# Patient Record
Sex: FEMALE | Race: BLACK OR AFRICAN AMERICAN | Employment: OTHER | URBAN - NONMETROPOLITAN AREA
[De-identification: names, ages, dates, MRNs, and addresses within clinical notes are randomized per-mention and may not be internally consistent; named-entity substitution may affect disease eponyms.]

---

## 2017-02-18 ENCOUNTER — NURSE TRIAGE (OUTPATIENT)
Dept: ADMINISTRATIVE | Age: 57
End: 2017-02-18

## 2017-04-05 ENCOUNTER — OFFICE VISIT (OUTPATIENT)
Dept: FAMILY MEDICINE CLINIC | Age: 57
End: 2017-04-05

## 2017-04-05 VITALS
DIASTOLIC BLOOD PRESSURE: 78 MMHG | HEART RATE: 60 BPM | BODY MASS INDEX: 35.7 KG/M2 | RESPIRATION RATE: 16 BRPM | WEIGHT: 194 LBS | SYSTOLIC BLOOD PRESSURE: 138 MMHG | TEMPERATURE: 99.1 F | HEIGHT: 62 IN

## 2017-04-05 DIAGNOSIS — I10 BENIGN ESSENTIAL HTN: ICD-10-CM

## 2017-04-05 DIAGNOSIS — R09.81 NASAL CONGESTION: ICD-10-CM

## 2017-04-05 DIAGNOSIS — Q01.9 CEPHALOCELE (HCC): ICD-10-CM

## 2017-04-05 DIAGNOSIS — E78.00 PURE HYPERCHOLESTEROLEMIA: ICD-10-CM

## 2017-04-05 DIAGNOSIS — Z00.00 ANNUAL PHYSICAL EXAM: Primary | ICD-10-CM

## 2017-04-05 PROCEDURE — 99396 PREV VISIT EST AGE 40-64: CPT | Performed by: FAMILY MEDICINE

## 2017-04-05 RX ORDER — FLUTICASONE PROPIONATE 50 MCG
1 SPRAY, SUSPENSION (ML) NASAL DAILY
Qty: 1 BOTTLE | Refills: 3 | Status: SHIPPED | OUTPATIENT
Start: 2017-04-05 | End: 2017-05-05

## 2017-05-06 PROBLEM — R79.89 ELEVATED TROPONIN: Status: ACTIVE | Noted: 2017-05-06

## 2017-05-06 PROBLEM — K22.10 EROSIVE ESOPHAGITIS: Status: ACTIVE | Noted: 2017-05-06

## 2017-05-06 PROBLEM — I10 ESSENTIAL HYPERTENSION: Status: ACTIVE | Noted: 2017-05-06

## 2017-05-06 PROBLEM — R77.8 ELEVATED TROPONIN: Status: ACTIVE | Noted: 2017-05-06

## 2017-05-06 PROBLEM — R12 HEARTBURN: Status: ACTIVE | Noted: 2017-05-06

## 2017-05-06 PROBLEM — R10.11 POSTPRANDIAL RUQ PAIN: Status: ACTIVE | Noted: 2017-05-06

## 2017-05-06 PROBLEM — R07.9 CHEST PAIN: Status: ACTIVE | Noted: 2017-05-06

## 2017-05-06 PROBLEM — K31.84 GASTROPARESIS: Status: ACTIVE | Noted: 2017-05-06

## 2017-05-09 ENCOUNTER — TELEPHONE (OUTPATIENT)
Dept: FAMILY MEDICINE CLINIC | Age: 57
End: 2017-05-09

## 2017-05-15 ENCOUNTER — OFFICE VISIT (OUTPATIENT)
Dept: FAMILY MEDICINE CLINIC | Age: 57
End: 2017-05-15

## 2017-05-15 VITALS
HEART RATE: 50 BPM | RESPIRATION RATE: 12 BRPM | BODY MASS INDEX: 35.3 KG/M2 | WEIGHT: 191.8 LBS | SYSTOLIC BLOOD PRESSURE: 110 MMHG | TEMPERATURE: 98.2 F | HEIGHT: 62 IN | DIASTOLIC BLOOD PRESSURE: 64 MMHG

## 2017-05-15 DIAGNOSIS — R07.9 CHEST PAIN, UNSPECIFIED TYPE: Primary | ICD-10-CM

## 2017-05-15 DIAGNOSIS — K31.84 GASTROPARESIS: ICD-10-CM

## 2017-05-15 PROCEDURE — 99495 TRANSJ CARE MGMT MOD F2F 14D: CPT | Performed by: FAMILY MEDICINE

## 2017-05-16 ENCOUNTER — TELEPHONE (OUTPATIENT)
Dept: FAMILY MEDICINE CLINIC | Age: 57
End: 2017-05-16

## 2017-05-16 DIAGNOSIS — K29.50 OTHER CHRONIC GASTRITIS WITHOUT HEMORRHAGE: ICD-10-CM

## 2017-05-16 DIAGNOSIS — K31.84 GASTROPARESIS: Primary | ICD-10-CM

## 2017-05-16 RX ORDER — SUCRALFATE ORAL 1 G/10ML
1 SUSPENSION ORAL 4 TIMES DAILY
Qty: 1200 ML | Refills: 3 | Status: SHIPPED | OUTPATIENT
Start: 2017-05-16 | End: 2017-11-17

## 2017-05-22 ENCOUNTER — NURSE TRIAGE (OUTPATIENT)
Dept: ADMINISTRATIVE | Age: 57
End: 2017-05-22

## 2017-06-27 ENCOUNTER — TELEPHONE (OUTPATIENT)
Dept: FAMILY MEDICINE CLINIC | Age: 57
End: 2017-06-27

## 2017-06-27 DIAGNOSIS — R05.9 COUGH: Primary | ICD-10-CM

## 2017-06-27 RX ORDER — BENZONATATE 200 MG/1
200 CAPSULE ORAL 3 TIMES DAILY PRN
Qty: 30 CAPSULE | Refills: 0 | Status: SHIPPED | OUTPATIENT
Start: 2017-06-27 | End: 2017-07-07

## 2017-07-25 ENCOUNTER — TELEPHONE (OUTPATIENT)
Dept: FAMILY MEDICINE CLINIC | Age: 57
End: 2017-07-25

## 2017-07-25 ENCOUNTER — OFFICE VISIT (OUTPATIENT)
Dept: FAMILY MEDICINE CLINIC | Age: 57
End: 2017-07-25
Payer: COMMERCIAL

## 2017-07-25 VITALS
SYSTOLIC BLOOD PRESSURE: 138 MMHG | TEMPERATURE: 97.5 F | WEIGHT: 195.8 LBS | BODY MASS INDEX: 36.03 KG/M2 | HEART RATE: 60 BPM | RESPIRATION RATE: 12 BRPM | DIASTOLIC BLOOD PRESSURE: 77 MMHG | HEIGHT: 62 IN

## 2017-07-25 DIAGNOSIS — J01.90 ACUTE RHINOSINUSITIS: Primary | ICD-10-CM

## 2017-07-25 PROCEDURE — 99213 OFFICE O/P EST LOW 20 MIN: CPT | Performed by: FAMILY MEDICINE

## 2017-07-25 RX ORDER — AMOXICILLIN 500 MG/1
500 CAPSULE ORAL 2 TIMES DAILY
Qty: 14 CAPSULE | Refills: 0 | Status: SHIPPED | OUTPATIENT
Start: 2017-07-25 | End: 2017-08-01

## 2017-07-25 RX ORDER — AMOXICILLIN AND CLAVULANATE POTASSIUM 875; 125 MG/1; MG/1
1 TABLET, FILM COATED ORAL 2 TIMES DAILY
Qty: 20 TABLET | Refills: 0 | Status: SHIPPED | OUTPATIENT
Start: 2017-07-25 | End: 2017-08-04

## 2017-07-25 ASSESSMENT — PATIENT HEALTH QUESTIONNAIRE - PHQ9
SUM OF ALL RESPONSES TO PHQ9 QUESTIONS 1 & 2: 0
1. LITTLE INTEREST OR PLEASURE IN DOING THINGS: 0
2. FEELING DOWN, DEPRESSED OR HOPELESS: 0
SUM OF ALL RESPONSES TO PHQ QUESTIONS 1-9: 0

## 2017-07-27 ENCOUNTER — HOSPITAL ENCOUNTER (OUTPATIENT)
Dept: MRI IMAGING | Age: 57
Discharge: HOME | End: 2017-07-27
Payer: COMMERCIAL

## 2017-07-27 DIAGNOSIS — H53.431 ARCUATE VISUAL FIELD DEFECT OF RIGHT EYE: ICD-10-CM

## 2017-07-27 PROCEDURE — A9579 GAD-BASE MR CONTRAST NOS,1ML: HCPCS | Performed by: OPHTHALMOLOGY

## 2017-07-27 PROCEDURE — 6360000004 HC RX CONTRAST MEDICATION: Performed by: OPHTHALMOLOGY

## 2017-07-27 PROCEDURE — 70553 MRI BRAIN STEM W/O & W/DYE: CPT

## 2017-07-27 RX ADMIN — GADOVERSETAMIDE 15 ML: 0.5 INJECTION, SOLUTION INTRAVENOUS at 11:28

## 2017-07-31 ENCOUNTER — OFFICE VISIT (OUTPATIENT)
Dept: NEUROSURGERY | Age: 57
End: 2017-07-31
Payer: COMMERCIAL

## 2017-07-31 VITALS
SYSTOLIC BLOOD PRESSURE: 150 MMHG | DIASTOLIC BLOOD PRESSURE: 83 MMHG | BODY MASS INDEX: 35.85 KG/M2 | WEIGHT: 194.8 LBS | HEART RATE: 51 BPM | HEIGHT: 62 IN

## 2017-07-31 DIAGNOSIS — Q01.9 ENCEPHALOCELE (HCC): Primary | ICD-10-CM

## 2017-07-31 PROCEDURE — 99201 PR OFFICE OUTPATIENT NEW 10 MINUTES: CPT | Performed by: NEUROLOGICAL SURGERY

## 2017-08-03 ENCOUNTER — TELEPHONE (OUTPATIENT)
Dept: FAMILY MEDICINE CLINIC | Age: 57
End: 2017-08-03

## 2017-08-03 DIAGNOSIS — F41.9 ANXIETY: Primary | ICD-10-CM

## 2017-08-03 RX ORDER — BUSPIRONE HYDROCHLORIDE 5 MG/1
5 TABLET ORAL 2 TIMES DAILY
Qty: 60 TABLET | Refills: 1 | Status: SHIPPED | OUTPATIENT
Start: 2017-08-03 | End: 2017-12-04 | Stop reason: ALTCHOICE

## 2017-08-16 ENCOUNTER — NURSE TRIAGE (OUTPATIENT)
Dept: ADMINISTRATIVE | Age: 57
End: 2017-08-16

## 2017-08-17 ENCOUNTER — NURSE TRIAGE (OUTPATIENT)
Dept: ADMINISTRATIVE | Age: 57
End: 2017-08-17

## 2017-08-18 ENCOUNTER — OFFICE VISIT (OUTPATIENT)
Dept: FAMILY MEDICINE CLINIC | Age: 57
End: 2017-08-18
Payer: COMMERCIAL

## 2017-08-18 VITALS
HEIGHT: 62 IN | SYSTOLIC BLOOD PRESSURE: 130 MMHG | TEMPERATURE: 98 F | DIASTOLIC BLOOD PRESSURE: 70 MMHG | RESPIRATION RATE: 14 BRPM | WEIGHT: 192 LBS | HEART RATE: 56 BPM | BODY MASS INDEX: 35.33 KG/M2

## 2017-08-18 DIAGNOSIS — F41.9 ANXIETY: Primary | ICD-10-CM

## 2017-08-18 PROCEDURE — 99213 OFFICE O/P EST LOW 20 MIN: CPT | Performed by: FAMILY MEDICINE

## 2017-08-18 RX ORDER — HYDROXYZINE HYDROCHLORIDE 25 MG/1
25-50 TABLET, FILM COATED ORAL EVERY 8 HOURS PRN
Qty: 60 TABLET | Refills: 1 | Status: SHIPPED | OUTPATIENT
Start: 2017-08-18 | End: 2017-12-04 | Stop reason: ALTCHOICE

## 2017-09-05 RX ORDER — ROSUVASTATIN CALCIUM 40 MG/1
40 TABLET, COATED ORAL WEEKLY
Qty: 4 TABLET | Refills: 5 | Status: SHIPPED | OUTPATIENT
Start: 2017-09-05 | End: 2018-06-19 | Stop reason: SDUPTHER

## 2017-10-09 RX ORDER — PANTOPRAZOLE SODIUM 40 MG/1
40 TABLET, DELAYED RELEASE ORAL
Qty: 60 TABLET | Refills: 3 | Status: SHIPPED | OUTPATIENT
Start: 2017-10-09 | End: 2018-10-22 | Stop reason: SDUPTHER

## 2017-10-10 ENCOUNTER — OFFICE VISIT (OUTPATIENT)
Dept: FAMILY MEDICINE CLINIC | Age: 57
End: 2017-10-10
Payer: COMMERCIAL

## 2017-10-10 VITALS
WEIGHT: 189 LBS | RESPIRATION RATE: 10 BRPM | BODY MASS INDEX: 31.49 KG/M2 | HEIGHT: 65 IN | HEART RATE: 64 BPM | TEMPERATURE: 98.4 F | DIASTOLIC BLOOD PRESSURE: 72 MMHG | SYSTOLIC BLOOD PRESSURE: 122 MMHG

## 2017-10-10 DIAGNOSIS — F43.22 ADJUSTMENT DISORDER WITH ANXIOUS MOOD: Primary | ICD-10-CM

## 2017-10-10 DIAGNOSIS — R10.11 POSTPRANDIAL RUQ PAIN: ICD-10-CM

## 2017-10-10 PROCEDURE — 99213 OFFICE O/P EST LOW 20 MIN: CPT | Performed by: FAMILY MEDICINE

## 2017-10-10 RX ORDER — METOCLOPRAMIDE 10 MG/1
10 TABLET ORAL
Qty: 120 TABLET | Refills: 3 | Status: SHIPPED | OUTPATIENT
Start: 2017-10-10 | End: 2017-11-17

## 2017-10-10 NOTE — PROGRESS NOTES
Visit Information    Have you changed or started any medications since your last visit including any over-the-counter medicines, vitamins, or herbal medicines? no   Are you having any side effects from any of your medications? -  no  Have you stopped taking any of your medications? Is so, why? -  no    Have you seen any other physician or provider since your last visit? No  Have you had any other diagnostic tests since your last visit? No  Have you been seen in the emergency room and/or had an admission to a hospital since we last saw you? No  Have you had your routine dental cleaning in the past 6 months? yes     Have you activated your MyLikes account? If not, what are your barriers?  Yes     Patient Care Team:  Avelina Darnell DO as PCP - General (Family Medicine)        Health Maintenance   Topic Date Due    Hepatitis C screen  1960    HIV screen  06/18/1974    DTaP/Tdap/Td vaccine (1 - Tdap) 06/18/1978    Flu vaccine (1) 09/01/2017    Cervical cancer screen  12/15/2017    Breast cancer screen  11/17/2018    Lipid screen  05/08/2022    Colon cancer screen colonoscopy  12/15/2022

## 2017-10-10 NOTE — PROGRESS NOTES
Edita Whipple is a 61 y.o. female that presents for Anxiety (6 week f/u) and Immunizations (Td, pt states she was scratched by a nail a couple weeks ago.)        HPI:    Anxiety     HPI:  States that she is doing better since starting the buspar. She also states that her eye is doing better. States that her stress level is improving. Mood is 'up and down'. Inciting events or triggers for anxiety - related to her health   Frequency of anxiety - several days per week  Sleep Disturbances? Yes - sometimes  Impaired concentration? No  Substance abuse? No  Suicidal/Homicidal Ideation? No    Compliant with meds: yes  Med side effects: No    Sees therapist?: No  Family History of Mental Illness? No      Health Maintenance   Topic Date Due    Hepatitis C screen  1960    HIV screen  06/18/1974    DTaP/Tdap/Td vaccine (1 - Tdap) 06/18/1978    Flu vaccine (1) 09/01/2017    Cervical cancer screen  12/15/2017    Breast cancer screen  11/17/2018    Lipid screen  05/08/2022    Colon cancer screen colonoscopy  12/15/2022       Past Medical History:   Diagnosis Date    Brain cyst     Hyperlipidemia     Hypertension        Past Surgical History:   Procedure Laterality Date    COLONOSCOPY  2009    EYE SURGERY      EYE SURGERY      HYSTERECTOMY      UPPER GASTROINTESTINAL ENDOSCOPY  2016       Social History   Substance Use Topics    Smoking status: Former Smoker     Quit date: 1/1/1990    Smokeless tobacco: Never Used    Alcohol use No       Family History   Problem Relation Age of Onset    Cancer Mother     Heart Disease Mother     High Blood Pressure Mother     Diabetes Mother     High Blood Pressure Father          I have reviewed the patient's past medical history, past surgical history, allergies, medications, social and family history and I have made updates where appropriate.     ROS        PHYSICAL EXAM:  /72   Pulse 64   Temp 98.4 °F (36.9 °C) (Oral)   Resp 10   Ht 5' 4.57\" (1.64 m)   Wt 189 lb (85.7 kg)   BMI 31.87 kg/m²     General Appearance: well developed and well- nourished, in no acute distress  Head: normocephalic and atraumatic  ENT: external ear and ear canal normal bilaterally, nose without deformity, nasal mucosa and turbinates normal without polyps, oropharynx normal, dentition is normal for age, no lip or gum lesions noted  Neck: supple and non-tender without mass, no thyromegaly or thyroid nodules, no cervical lymphadenopathy  Pulmonary/Chest: clear to auscultation bilaterally- no wheezes, rales or rhonchi, normal air movement, no respiratory distress or retractions  Cardiovascular: normal rate, regular rhythm, normal S1 and S2, no murmurs, rubs, clicks, or gallops, distal pulses intact  Extremities: no cyanosis, clubbing or edema of the lower extremities  Psych:  Normal affect without evidence of depression or anxiety, insight and judgement are appropriate, memory appears intact  Skin: warm and dry, no rash or erythema      ASSESSMENT & PLAN  Amber Doty was seen today for anxiety and immunizations. Diagnoses and all orders for this visit:    Adjustment disorder with anxious mood    Postprandial RUQ pain  -     metoclopramide (REGLAN) 10 MG tablet; Take 1 tablet by mouth 4 times daily (before meals and nightly)        Return if symptoms worsen or fail to improve.    -anxiety seems related to her eye condition and is improving. Advised to stay on buspar for at least another month and then can try stopping this and see how she does. -Patient advised to call immediately or go to ER if any worsening of symptoms      Montserrat received counseling on the following healthy behaviors: medication adherence  Reviewed prior labs and health maintenance. Continue current medications, diet and exercise. Discussed use, benefit, and side effects of prescribed medications. Barriers to medication compliance addressed. Patient given educational materials - see patient instructions.     All patient questions answered. Patient voiced understanding.

## 2017-10-30 ENCOUNTER — TELEPHONE (OUTPATIENT)
Dept: FAMILY MEDICINE CLINIC | Age: 57
End: 2017-10-30

## 2017-10-30 NOTE — TELEPHONE ENCOUNTER
Patient started with an itchy rash. She stopped taking her Pantoprazole, Carafate, and Metoclopramide about a week ago and the rash cleared up.   Please advise

## 2017-10-30 NOTE — TELEPHONE ENCOUNTER
I would recommend continuing the protonix as she had been on this previously, but can stop the reglan and carafate.

## 2017-11-06 ENCOUNTER — TELEPHONE (OUTPATIENT)
Dept: FAMILY MEDICINE CLINIC | Age: 57
End: 2017-11-06

## 2017-11-06 ENCOUNTER — OFFICE VISIT (OUTPATIENT)
Dept: NEUROSURGERY | Age: 57
End: 2017-11-06
Payer: COMMERCIAL

## 2017-11-06 VITALS
HEART RATE: 51 BPM | HEIGHT: 65 IN | BODY MASS INDEX: 32.15 KG/M2 | SYSTOLIC BLOOD PRESSURE: 132 MMHG | DIASTOLIC BLOOD PRESSURE: 82 MMHG | WEIGHT: 193 LBS

## 2017-11-06 DIAGNOSIS — Q01.9 ENCEPHALOCELE (HCC): Primary | ICD-10-CM

## 2017-11-06 DIAGNOSIS — M79.10 MYALGIA: Primary | ICD-10-CM

## 2017-11-06 PROCEDURE — 99211 OFF/OP EST MAY X REQ PHY/QHP: CPT | Performed by: NEUROLOGICAL SURGERY

## 2017-11-06 RX ORDER — CYCLOBENZAPRINE HCL 5 MG
5 TABLET ORAL 3 TIMES DAILY PRN
Qty: 60 TABLET | Refills: 0 | Status: SHIPPED | OUTPATIENT
Start: 2017-11-06 | End: 2017-11-16

## 2017-11-06 NOTE — PROGRESS NOTES
[address] [address] [address] [facility] [address]  Dept: [phone]  Dept Fax: [phone]  Loc: [phone]    Amandeep Young is a 61 y.o. female who presents today for her medical conditions/complaints as noted below. Chief Complaint   Patient presents with    Follow-up     Encephalocele 3 mo f/u           HPI:     HPI    Past Medical History:   Diagnosis Date    Brain cyst     Hyperlipidemia     Hypertension       Past Surgical History:   Procedure Laterality Date    COLONOSCOPY  2009    EYE SURGERY      EYE SURGERY      HYSTERECTOMY      UPPER GASTROINTESTINAL ENDOSCOPY  2016       Family History   Problem Relation Age of Onset    Cancer Mother     Heart Disease Mother     High Blood Pressure Mother     Diabetes Mother     High Blood Pressure Father        Social History   Substance Use Topics    Smoking status: Former Smoker     Quit date: 1/1/1990    Smokeless tobacco: Never Used    Alcohol use No      Current Outpatient Prescriptions   Medication Sig Dispense Refill    metoclopramide (REGLAN) 10 MG tablet Take 1 tablet by mouth 4 times daily (before meals and nightly) 120 tablet 3    rosuvastatin (CRESTOR) 40 MG tablet Take 1 tablet by mouth once a week 4 tablet 5    pantoprazole (PROTONIX) 40 MG tablet Take 1 tablet by mouth 2 times daily (before meals) 60 tablet 3    hydrOXYzine (ATARAX) 25 MG tablet Take 1-2 tablets by mouth every 8 hours as needed for Anxiety 60 tablet 1    busPIRone (BUSPAR) 5 MG tablet Take 1 tablet by mouth 2 times daily 60 tablet 1    sucralfate (CARAFATE) 1 GM/10ML suspension Take 10 mLs by mouth 4 times daily 1200 mL 3     No current facility-administered medications for this visit.       Allergies   Allergen Reactions    Cephalexin     Estrogens Hives and Itching    Prednisone     Statins      Myalgias    Sulfa Antibiotics        Health Maintenance   Topic Date Due    Hepatitis C screen  1960    HIV screen  1974    DTaP/Tdap/Td vaccine (1 - Tdap) 1978    Flu vaccine (1) 2017    Cervical cancer screen  12/15/2017    Breast cancer screen  2018    Lipid screen  2022    Colon cancer screen colonoscopy  12/15/2022       Subjective:      Review of Systems    Objective:     Physical Exam  /82 (Site: Right Arm, Position: Sitting)   Pulse 51   Ht 5' 4.57\" (1.64 m)   Wt 193 lb (87.5 kg)   BMI 32.55 kg/m²     Assessment:      No diagnosis found. Plan:        Montserrat received counseling on the following healthy behaviors: {Health Counselin}    Patient given educational materials on {Educational Materials:}    I have instructed Montserrat to complete a self tracking handout on {Health Trackin} and instructed them to bring it with them to her next appointment. Discussed use, benefit, and side effects of prescribed medications. Barriers to medication compliance addressed. All patient questions answered. Pt voiced understanding. No Follow-up on file. No orders of the defined types were placed in this encounter. No orders of the defined types were placed in this encounter. Reccommended tobacco cessation options including pharmacologic methods, counseled great than 3 minutes during this visit:  Yes  []  No  []     Patient given educational materials - see patient instructions. Discussed use, benefit, and side effects of prescribed medications. All patient questions answered. Pt voiced understanding. Reviewed health maintenance. Instructed to continue current medications, diet and exercise. Patient agreed with treatment plan. Follow up as directed.      Electronically signed by Rodrigue Renee CNP on 2017 at 10:16 AM

## 2017-11-13 ENCOUNTER — TELEPHONE (OUTPATIENT)
Dept: FAMILY MEDICINE CLINIC | Age: 57
End: 2017-11-13

## 2017-11-13 NOTE — TELEPHONE ENCOUNTER
Patient stopped taking the carafate and reglan due to having a reaction end of October. She is now having pain after eating in chest area, severe nausea. Rash went away and has not returned. Please advise.

## 2017-11-17 ENCOUNTER — OFFICE VISIT (OUTPATIENT)
Dept: FAMILY MEDICINE CLINIC | Age: 57
End: 2017-11-17
Payer: COMMERCIAL

## 2017-11-17 VITALS
TEMPERATURE: 98.8 F | SYSTOLIC BLOOD PRESSURE: 124 MMHG | HEART RATE: 52 BPM | HEIGHT: 62 IN | DIASTOLIC BLOOD PRESSURE: 74 MMHG | RESPIRATION RATE: 16 BRPM | WEIGHT: 194 LBS | BODY MASS INDEX: 35.7 KG/M2

## 2017-11-17 DIAGNOSIS — R12 HEARTBURN: ICD-10-CM

## 2017-11-17 DIAGNOSIS — K31.84 GASTROPARESIS: Primary | ICD-10-CM

## 2017-11-17 PROCEDURE — 99213 OFFICE O/P EST LOW 20 MIN: CPT | Performed by: FAMILY MEDICINE

## 2017-11-17 RX ORDER — ERYTHROMYCIN 250 MG/1
250 TABLET, COATED ORAL 3 TIMES DAILY
Qty: 90 TABLET | Refills: 3 | Status: SHIPPED | OUTPATIENT
Start: 2017-11-17 | End: 2017-12-04 | Stop reason: ALTCHOICE

## 2017-11-17 NOTE — PROGRESS NOTES
Thai Griffith is a 61 y.o. female that presents for Nausea (Pt states that she has been feeling nauseous and experiencing chest pain after eating for about 3 weeks. )        HPI:    Patient notes that she has been having nausea and chest discomfort following eating for about the past 3 weeks. 3 weeks ago, she stopped taking her carafate and reglan due to a rash. She has a history of GERD and gastroparesis. States that she is still taking the protonix. States that after she eats, she is noting more chest discomfort. She feels bloated with eating.  +nausea and vomiting intermittently. Notices this mostly with spicy foods. Having some constipation with this. Health Maintenance   Topic Date Due    Hepatitis C screen  1960    HIV screen  06/18/1974    DTaP/Tdap/Td vaccine (1 - Tdap) 06/18/1978    Flu vaccine (1) 09/01/2017    Cervical cancer screen  12/15/2017    Breast cancer screen  11/17/2018    Lipid screen  05/08/2022    Colon cancer screen colonoscopy  12/15/2022       Past Medical History:   Diagnosis Date    Brain cyst     Hyperlipidemia     Hypertension        Past Surgical History:   Procedure Laterality Date    COLONOSCOPY  2009    EYE SURGERY      EYE SURGERY      HYSTERECTOMY      UPPER GASTROINTESTINAL ENDOSCOPY  2016       Social History   Substance Use Topics    Smoking status: Former Smoker     Quit date: 1/1/1990    Smokeless tobacco: Never Used    Alcohol use No       Family History   Problem Relation Age of Onset    Cancer Mother     Heart Disease Mother     High Blood Pressure Mother     Diabetes Mother     High Blood Pressure Father          I have reviewed the patient's past medical history, past surgical history, allergies, medications, social and family history and I have made updates where appropriate.     ROS        PHYSICAL EXAM:  /74   Pulse 52   Temp 98.8 °F (37.1 °C) (Oral)   Resp 16   Ht 5' 2\" (1.575 m)   Wt 194 lb (88 kg)   BMI 35.48

## 2017-11-17 NOTE — PROGRESS NOTES
Visit Information    Have you changed or started any medications since your last visit including any over-the-counter medicines, vitamins, or herbal medicines? no   Are you having any side effects from any of your medications? -  no  Have you stopped taking any of your medications? Is so, why? -  yes - see med list    Have you seen any other physician or provider since your last visit? No  Have you had any other diagnostic tests since your last visit? No  Have you been seen in the emergency room and/or had an admission to a hospital since we last saw you? No  Have you had your routine dental cleaning in the past 6 months? yes - routine    Have you activated your [employer] account? If not, what are your barriers?  Yes     Patient Care Team:  Dilshad Bernal DO as PCP - General (Family Medicine)    Medical History Review  Past Medical, Family, and Social History reviewed and does not contribute to the patient presenting condition    Health Maintenance   Topic Date Due    Hepatitis C screen  1960    HIV screen  06/18/1974    DTaP/Tdap/Td vaccine (1 - Tdap) 06/18/1978    Flu vaccine (1) 09/01/2017    Cervical cancer screen  12/15/2017    Breast cancer screen  11/17/2018    Lipid screen  05/08/2022    Colon cancer screen colonoscopy  12/15/2022

## 2018-02-07 ENCOUNTER — OFFICE VISIT (OUTPATIENT)
Dept: FAMILY MEDICINE CLINIC | Age: 58
End: 2018-02-07
Payer: COMMERCIAL

## 2018-02-07 VITALS
SYSTOLIC BLOOD PRESSURE: 128 MMHG | WEIGHT: 194.6 LBS | DIASTOLIC BLOOD PRESSURE: 86 MMHG | BODY MASS INDEX: 35.81 KG/M2 | TEMPERATURE: 98.2 F | HEIGHT: 62 IN | HEART RATE: 60 BPM | RESPIRATION RATE: 16 BRPM

## 2018-02-07 DIAGNOSIS — M79.10 MYALGIA: ICD-10-CM

## 2018-02-07 DIAGNOSIS — J20.8 ACUTE BRONCHITIS DUE TO OTHER SPECIFIED ORGANISMS: Primary | ICD-10-CM

## 2018-02-07 DIAGNOSIS — J01.00 ACUTE NON-RECURRENT MAXILLARY SINUSITIS: ICD-10-CM

## 2018-02-07 DIAGNOSIS — R09.81 NASAL CONGESTION: ICD-10-CM

## 2018-02-07 LAB
INFLUENZA A ANTIBODY: NEGATIVE
INFLUENZA B ANTIBODY: NEGATIVE

## 2018-02-07 PROCEDURE — 87804 INFLUENZA ASSAY W/OPTIC: CPT | Performed by: NURSE PRACTITIONER

## 2018-02-07 PROCEDURE — 99213 OFFICE O/P EST LOW 20 MIN: CPT | Performed by: NURSE PRACTITIONER

## 2018-02-07 RX ORDER — PROMETHAZINE HYDROCHLORIDE AND CODEINE PHOSPHATE 6.25; 1 MG/5ML; MG/5ML
5 SYRUP ORAL EVERY 4 HOURS PRN
Qty: 105 ML | Refills: 0 | Status: SHIPPED | OUTPATIENT
Start: 2018-02-07 | End: 2018-02-14

## 2018-02-07 RX ORDER — CETIRIZINE HYDROCHLORIDE 10 MG/1
10 TABLET ORAL DAILY
Qty: 30 TABLET | Refills: 0 | Status: SHIPPED | OUTPATIENT
Start: 2018-02-07 | End: 2019-06-19

## 2018-02-07 RX ORDER — AMOXICILLIN 500 MG/1
500 TABLET, FILM COATED ORAL 3 TIMES DAILY
Qty: 30 TABLET | Refills: 0 | Status: SHIPPED | OUTPATIENT
Start: 2018-02-07 | End: 2018-07-31 | Stop reason: ALTCHOICE

## 2018-02-07 RX ORDER — ALBUTEROL SULFATE 90 UG/1
2 AEROSOL, METERED RESPIRATORY (INHALATION) EVERY 6 HOURS PRN
Qty: 1 INHALER | Refills: 0 | Status: SHIPPED | OUTPATIENT
Start: 2018-02-07 | End: 2019-06-19

## 2018-02-07 RX ORDER — ECHINACEA PURPUREA EXTRACT 125 MG
1 TABLET ORAL PRN
Qty: 1 BOTTLE | Refills: 3 | Status: SHIPPED | OUTPATIENT
Start: 2018-02-07 | End: 2019-06-19

## 2018-02-15 ENCOUNTER — OFFICE VISIT (OUTPATIENT)
Dept: FAMILY MEDICINE CLINIC | Age: 58
End: 2018-02-15
Payer: COMMERCIAL

## 2018-02-15 VITALS
BODY MASS INDEX: 34.38 KG/M2 | HEIGHT: 63 IN | SYSTOLIC BLOOD PRESSURE: 122 MMHG | HEART RATE: 55 BPM | WEIGHT: 194 LBS | RESPIRATION RATE: 14 BRPM | TEMPERATURE: 99.3 F | DIASTOLIC BLOOD PRESSURE: 80 MMHG

## 2018-02-15 DIAGNOSIS — H69.81 DYSFUNCTION OF RIGHT EUSTACHIAN TUBE: ICD-10-CM

## 2018-02-15 DIAGNOSIS — G89.29 CHRONIC PAIN OF BOTH SHOULDERS: Primary | ICD-10-CM

## 2018-02-15 DIAGNOSIS — M25.512 CHRONIC PAIN OF BOTH SHOULDERS: Primary | ICD-10-CM

## 2018-02-15 DIAGNOSIS — M25.511 CHRONIC PAIN OF BOTH SHOULDERS: Primary | ICD-10-CM

## 2018-02-15 PROCEDURE — 99213 OFFICE O/P EST LOW 20 MIN: CPT | Performed by: FAMILY MEDICINE

## 2018-02-15 RX ORDER — FLUTICASONE PROPIONATE 50 MCG
2 SPRAY, SUSPENSION (ML) NASAL DAILY
Qty: 1 BOTTLE | Refills: 0 | Status: SHIPPED | OUTPATIENT
Start: 2018-02-15 | End: 2018-07-28 | Stop reason: SDUPTHER

## 2018-06-19 RX ORDER — ROSUVASTATIN CALCIUM 40 MG/1
40 TABLET, COATED ORAL WEEKLY
Qty: 4 TABLET | Refills: 5 | Status: SHIPPED | OUTPATIENT
Start: 2018-06-19 | End: 2019-02-25 | Stop reason: SDUPTHER

## 2018-07-28 ENCOUNTER — HOSPITAL ENCOUNTER (EMERGENCY)
Age: 58
Discharge: HOME | End: 2018-07-28
Payer: MEDICARE

## 2018-07-28 VITALS
HEART RATE: 62 BPM | TEMPERATURE: 99 F | OXYGEN SATURATION: 100 % | DIASTOLIC BLOOD PRESSURE: 76 MMHG | SYSTOLIC BLOOD PRESSURE: 132 MMHG | RESPIRATION RATE: 16 BRPM

## 2018-07-28 DIAGNOSIS — J01.90 ACUTE SINUSITIS, RECURRENCE NOT SPECIFIED, UNSPECIFIED LOCATION: Primary | ICD-10-CM

## 2018-07-28 PROCEDURE — 99282 EMERGENCY DEPT VISIT SF MDM: CPT

## 2018-07-28 PROCEDURE — 2709999900 HC NON-CHARGEABLE SUPPLY

## 2018-07-28 RX ORDER — GUAIFENESIN, PSEUDOEPHEDRINE HYDROCHLORIDE 600; 60 MG/1; MG/1
1 TABLET, EXTENDED RELEASE ORAL EVERY 12 HOURS
Qty: 20 TABLET | Refills: 1 | Status: SHIPPED | OUTPATIENT
Start: 2018-07-28 | End: 2018-08-07

## 2018-07-28 RX ORDER — FLUTICASONE PROPIONATE 50 MCG
1 SPRAY, SUSPENSION (ML) NASAL DAILY
Qty: 1 BOTTLE | Refills: 0 | Status: SHIPPED | OUTPATIENT
Start: 2018-07-28 | End: 2018-08-17

## 2018-07-28 ASSESSMENT — ENCOUNTER SYMPTOMS
SHORTNESS OF BREATH: 0
COUGH: 0
VOMITING: 0
BACK PAIN: 0
EYE PAIN: 0
ABDOMINAL PAIN: 0
EYE DISCHARGE: 0
WHEEZING: 0
DIARRHEA: 0
NAUSEA: 0
SORE THROAT: 0
RHINORRHEA: 0
SINUS PRESSURE: 1

## 2018-07-28 NOTE — ED PROVIDER NOTES
[facility]  eMERGENCY dEPARTMENT eNCOUnter          [address] [facility]       Chief Complaint   Patient presents with    Cough    Otalgia    Nasal Congestion       Nurses Notes reviewed and I agree except as noted in the HPI. HISTORY OF PRESENT ILLNESS    Yuri Galaviz is a 60 y.o. female who presents to the Emergency Department for the evaluation of sinus congestion. Patient reports that she has had a sinus infection, and today developed fullness in her bilateral ears. Patient denies taking any OTC medications for her sinus congestion. Patient reports allergies to Cephalexin, Prednisone, and Sulfa. No other complaints at this time. The HPI was provided by the patient. REVIEW OF SYSTEMS     Review of Systems   Constitutional: Negative for appetite change, chills, fatigue and fever. HENT: Positive for congestion, ear pain and sinus pressure. Negative for rhinorrhea and sore throat. Eyes: Negative for pain, discharge and visual disturbance. Respiratory: Negative for cough, shortness of breath and wheezing. Cardiovascular: Negative for chest pain, palpitations and leg swelling. Gastrointestinal: Negative for abdominal pain, diarrhea, nausea and vomiting. Genitourinary: Negative for difficulty urinating, dysuria, hematuria and vaginal discharge. Musculoskeletal: Negative for arthralgias, back pain, joint swelling and neck pain. Skin: Negative for pallor and rash. Neurological: Negative for dizziness, syncope, weakness, light-headedness, numbness and headaches. Hematological: Negative for adenopathy. Psychiatric/Behavioral: Negative for confusion and suicidal ideas. The patient is not nervous/anxious. PAST MEDICAL HISTORY    has a past medical history of Brain cyst; GERD (gastroesophageal reflux disease); Hyperlipidemia; and Hypertension. SURGICAL HISTORY      has a past surgical history that includes Hysterectomy; eye surgery; Eye surgery;  Colonoscopy (2009); and Upper gastrointestinal endoscopy (2016). CURRENT MEDICATIONS       Previous Medications    ALBUTEROL SULFATE HFA (PROVENTIL HFA) 108 (90 BASE) MCG/ACT INHALER    Inhale 2 puffs into the lungs every 6 hours as needed for Wheezing    AMOXICILLIN 500 MG TABS    Take 500 m by mouth 3 times daily    CETIRIZINE (ZYRTEC ALLERGY) 10 MG TABLET    Take 1 tablet by mouth daily    METOCLOPRAMIDE (REGLAN) 10 MG TABLET    Take 10 mg by mouth 4 times daily    PANTOPRAZOLE (PROTONIX) 40 MG TABLET    Take 1 tablet by mouth 2 times daily (before meals)    ROSUVASTATIN (CRESTOR) 40 MG TABLET    Take 1 tablet by mouth once a week    SODIUM CHLORIDE (ALTAMIST SPRAY) 0.65 % NASAL SPRAY    1 spray by Nasal route as needed for Congestion       ALLERGIES     is allergic to cephalexin; estrogens; prednisone; statins; and sulfa antibiotics. FAMILY HISTORY     indicated that her mother is . She indicated that her father is . She indicated that her sister is . She indicated that the status of her neg hx is unknown.    family history includes Breast Cancer in her sister; Diabetes in her mother; Heart Disease in her mother; High Blood Pressure in her father and mother. SOCIAL HISTORY      reports that she quit smoking about 28 years ago. She has never used smokeless tobacco. She reports that she does not drink alcohol or use drugs. PHYSICAL EXAM     INITIAL VITALS:  oral temperature is 99 °F (37.2 °C). Her blood pressure is 132/76 and her pulse is 62. Her respiration is 16 and oxygen saturation is 100%. Physical Exam   Constitutional: She is oriented to person, place, and time. She appears well-developed and well-nourished. Non-toxic appearance. HENT:   Head: Normocephalic and atraumatic.    Right Ear: Tympanic membrane, external ear and ear canal normal.   Left Ear: Tympanic membrane, external ear and ear canal normal.   Nose: Right sinus exhibits no maxillary sinus tenderness and no frontal within the ED today with congestion and sinus drainage. Within the department, I observed the patient's vital signs to be within acceptable range. On exam, I appreciated the right ear to be filled with wax, otherwise benign physical.  Patient's right ear was irrigated in the ED with improvement. I observed the patient's condition to remain stable during the duration of the stay. I explained my proposed course of treatment to the patient, who was amenable to my treatment and discharge decisions. The patient was discharged home in stable condition with prescriptions for Mucinex D and Flonase, and the patient will return to the ED if the symptoms become more severe in nature or otherwise change. CRITICAL CARE:   None     CONSULTS:  None    PROCEDURES:  None    FINAL IMPRESSION      1. Acute sinusitis, recurrence not specified, unspecified location          DISPOSITION/PLAN   Patient was discharged in stable condition. Will return if symptoms change or worsen, or for any sign or symptom deemed emergent by the patient or family members. Follow up as an outpatient, sooner if symptoms warrant.      PATIENT REFERRED TO:  Shyam Ceballos DO  [address] [place] [address] [address]   [phone]    In 1 week        DISCHARGE MEDICATIONS:  New Prescriptions    FLUTICASONE (FLONASE) 50 MCG/ACT NASAL SPRAY    1 spray by Nasal route daily for 20 days    PSEUDOEPHEDRINE-GUAIFENESIN (MUCINEX D)  MG PER EXTENDED RELEASE TABLET    Take 1 tablet by mouth every 12 hours for 20 doses       (Please note that portions of this note were completed with a voice recognition program.  Efforts were made to edit the dictations but occasionally words are mis-transcribed.)    Scribe:  Signed by: Kendall Agudelo, 7/28/18 2:04 PM Scribing for and in the presence of Joseph Carmona PA-C    Signed by: Kendall Agudelo, 7/28/18 2:28 PM    Provider:  I personally performed the services described in the documentation, reviewed and edited the documentation which was dictated to the scribe in my presence, and it accurately records my words and actions.     Klever Darling PA-C 7/28/18 2:28 PM       WADE Lin  07/28/18 [id number]

## 2018-07-31 ENCOUNTER — OFFICE VISIT (OUTPATIENT)
Dept: FAMILY MEDICINE CLINIC | Age: 58
End: 2018-07-31
Payer: MEDICARE

## 2018-07-31 VITALS
HEART RATE: 59 BPM | SYSTOLIC BLOOD PRESSURE: 120 MMHG | HEIGHT: 62 IN | DIASTOLIC BLOOD PRESSURE: 70 MMHG | BODY MASS INDEX: 35.22 KG/M2 | TEMPERATURE: 99.8 F | WEIGHT: 191.4 LBS

## 2018-07-31 DIAGNOSIS — Q01.9 CEPHALOCELE (HCC): ICD-10-CM

## 2018-07-31 DIAGNOSIS — H66.001 ACUTE SUPPURATIVE OTITIS MEDIA OF RIGHT EAR WITHOUT SPONTANEOUS RUPTURE OF TYMPANIC MEMBRANE, RECURRENCE NOT SPECIFIED: Primary | ICD-10-CM

## 2018-07-31 PROCEDURE — 1036F TOBACCO NON-USER: CPT | Performed by: FAMILY MEDICINE

## 2018-07-31 PROCEDURE — G8427 DOCREV CUR MEDS BY ELIG CLIN: HCPCS | Performed by: FAMILY MEDICINE

## 2018-07-31 PROCEDURE — 99213 OFFICE O/P EST LOW 20 MIN: CPT | Performed by: FAMILY MEDICINE

## 2018-07-31 PROCEDURE — 3017F COLORECTAL CA SCREEN DOC REV: CPT | Performed by: FAMILY MEDICINE

## 2018-07-31 PROCEDURE — G8417 CALC BMI ABV UP PARAM F/U: HCPCS | Performed by: FAMILY MEDICINE

## 2018-07-31 RX ORDER — DOXYCYCLINE HYCLATE 100 MG
100 TABLET ORAL 2 TIMES DAILY
Qty: 20 TABLET | Refills: 0 | Status: SHIPPED | OUTPATIENT
Start: 2018-07-31 | End: 2018-08-10

## 2018-07-31 ASSESSMENT — PATIENT HEALTH QUESTIONNAIRE - PHQ9
2. FEELING DOWN, DEPRESSED OR HOPELESS: 0
SUM OF ALL RESPONSES TO PHQ QUESTIONS 1-9: 0
SUM OF ALL RESPONSES TO PHQ9 QUESTIONS 1 & 2: 0
1. LITTLE INTEREST OR PLEASURE IN DOING THINGS: 0

## 2018-07-31 NOTE — PROGRESS NOTES
benefit, and side effects of prescribed medications. Barriers to medication compliance addressed. Patient given educational materials - see patient instructions. All patient questions answered. Patient voiced understanding.

## 2018-08-17 ENCOUNTER — APPOINTMENT (OUTPATIENT)
Dept: GENERAL RADIOLOGY | Age: 58
DRG: 309 | End: 2018-08-17
Payer: MEDICARE

## 2018-08-17 ENCOUNTER — HOSPITAL ENCOUNTER (INPATIENT)
Age: 58
LOS: 1 days | Discharge: HOME | DRG: 309 | End: 2018-08-19
Attending: INTERNAL MEDICINE | Admitting: INTERNAL MEDICINE
Payer: MEDICARE

## 2018-08-17 DIAGNOSIS — I47.1 PAROXYSMAL SUPRAVENTRICULAR TACHYCARDIA (HCC): ICD-10-CM

## 2018-08-17 DIAGNOSIS — R07.9 CHEST PAIN, UNSPECIFIED TYPE: Primary | ICD-10-CM

## 2018-08-17 LAB
ALBUMIN SERPL-MCNC: 4.5 G/DL (ref 3.5–5.1)
ALP BLD-CCNC: 55 U/L (ref 38–126)
ALT SERPL-CCNC: 27 U/L (ref 11–66)
ANION GAP SERPL CALCULATED.3IONS-SCNC: 11 MEQ/L (ref 8–16)
AST SERPL-CCNC: 34 U/L (ref 5–40)
BASOPHILS # BLD: 0.6 %
BASOPHILS ABSOLUTE: 0 THOU/MM3 (ref 0–0.1)
BILIRUB SERPL-MCNC: 0.3 MG/DL (ref 0.3–1.2)
BILIRUBIN DIRECT: < 0.2 MG/DL (ref 0–0.3)
BUN BLDV-MCNC: 5 MG/DL (ref 7–22)
CALCIUM SERPL-MCNC: 9.3 MG/DL (ref 8.5–10.5)
CHLORIDE BLD-SCNC: 105 MEQ/L (ref 98–111)
CO2: 26 MEQ/L (ref 23–33)
CREAT SERPL-MCNC: 0.9 MG/DL (ref 0.4–1.2)
D-DIMER QUANTITATIVE: 396 NG/ML FEU (ref 0–500)
EOSINOPHIL # BLD: 2.1 %
EOSINOPHILS ABSOLUTE: 0.1 THOU/MM3 (ref 0–0.4)
ERYTHROCYTE [DISTWIDTH] IN BLOOD BY AUTOMATED COUNT: 13.8 % (ref 11.5–14.5)
ERYTHROCYTE [DISTWIDTH] IN BLOOD BY AUTOMATED COUNT: 43.9 FL (ref 35–45)
GFR SERPL CREATININE-BSD FRML MDRD: 78 ML/MIN/1.73M2
GLUCOSE BLD-MCNC: 137 MG/DL (ref 70–108)
HCT VFR BLD CALC: 37.5 % (ref 37–47)
HEMOGLOBIN: 12.3 GM/DL (ref 12–16)
IMMATURE GRANS (ABS): 0.01 THOU/MM3 (ref 0–0.07)
IMMATURE GRANULOCYTES: 0.2 %
LIPASE: 39.1 U/L (ref 5.6–51.3)
LYMPHOCYTES # BLD: 58.6 %
LYMPHOCYTES ABSOLUTE: 3 THOU/MM3 (ref 1–4.8)
MCH RBC QN AUTO: 28.5 PG (ref 26–33)
MCHC RBC AUTO-ENTMCNC: 32.8 GM/DL (ref 32.2–35.5)
MCV RBC AUTO: 87 FL (ref 81–99)
MONOCYTES # BLD: 6.9 %
MONOCYTES ABSOLUTE: 0.4 THOU/MM3 (ref 0.4–1.3)
NUCLEATED RED BLOOD CELLS: 0 /100 WBC
OSMOLALITY CALCULATION: 282.5 MOSMOL/KG (ref 275–300)
PLATELET # BLD: 273 THOU/MM3 (ref 130–400)
PMV BLD AUTO: 10.9 FL (ref 9.4–12.4)
POTASSIUM SERPL-SCNC: 3.6 MEQ/L (ref 3.5–5.2)
RBC # BLD: 4.31 MILL/MM3 (ref 4.2–5.4)
SEG NEUTROPHILS: 31.6 %
SEGMENTED NEUTROPHILS ABSOLUTE COUNT: 1.6 THOU/MM3 (ref 1.8–7.7)
SODIUM BLD-SCNC: 142 MEQ/L (ref 135–145)
TOTAL PROTEIN: 7.6 G/DL (ref 6.1–8)
TROPONIN T: < 0.01 NG/ML
WBC # BLD: 5.2 THOU/MM3 (ref 4.8–10.8)

## 2018-08-17 PROCEDURE — G0378 HOSPITAL OBSERVATION PER HR: HCPCS

## 2018-08-17 PROCEDURE — 6370000000 HC RX 637 (ALT 250 FOR IP): Performed by: INTERNAL MEDICINE

## 2018-08-17 PROCEDURE — 84484 ASSAY OF TROPONIN QUANT: CPT

## 2018-08-17 PROCEDURE — 85025 COMPLETE CBC W/AUTO DIFF WBC: CPT

## 2018-08-17 PROCEDURE — 71045 X-RAY EXAM CHEST 1 VIEW: CPT

## 2018-08-17 PROCEDURE — 2709999900 HC NON-CHARGEABLE SUPPLY

## 2018-08-17 PROCEDURE — 2580000003 HC RX 258: Performed by: INTERNAL MEDICINE

## 2018-08-17 PROCEDURE — 80053 COMPREHEN METABOLIC PANEL: CPT

## 2018-08-17 PROCEDURE — 99285 EMERGENCY DEPT VISIT HI MDM: CPT

## 2018-08-17 PROCEDURE — 82248 BILIRUBIN DIRECT: CPT

## 2018-08-17 PROCEDURE — 36415 COLL VENOUS BLD VENIPUNCTURE: CPT

## 2018-08-17 PROCEDURE — 85379 FIBRIN DEGRADATION QUANT: CPT

## 2018-08-17 PROCEDURE — 6360000002 HC RX W HCPCS

## 2018-08-17 PROCEDURE — 93005 ELECTROCARDIOGRAM TRACING: CPT | Performed by: INTERNAL MEDICINE

## 2018-08-17 PROCEDURE — 83690 ASSAY OF LIPASE: CPT

## 2018-08-17 RX ORDER — POTASSIUM CHLORIDE 20 MEQ/1
40 TABLET, EXTENDED RELEASE ORAL PRN
Status: DISCONTINUED | OUTPATIENT
Start: 2018-08-17 | End: 2018-08-19 | Stop reason: HOSPADM

## 2018-08-17 RX ORDER — ROSUVASTATIN CALCIUM 10 MG/1
40 TABLET, COATED ORAL WEEKLY
Status: DISCONTINUED | OUTPATIENT
Start: 2018-08-18 | End: 2018-08-19 | Stop reason: HOSPADM

## 2018-08-17 RX ORDER — FLUTICASONE PROPIONATE 50 MCG
1 SPRAY, SUSPENSION (ML) NASAL DAILY
Status: DISCONTINUED | OUTPATIENT
Start: 2018-08-18 | End: 2018-08-19 | Stop reason: HOSPADM

## 2018-08-17 RX ORDER — METOCLOPRAMIDE 10 MG/1
10 TABLET ORAL 4 TIMES DAILY
Status: DISCONTINUED | OUTPATIENT
Start: 2018-08-17 | End: 2018-08-19 | Stop reason: HOSPADM

## 2018-08-17 RX ORDER — ONDANSETRON 2 MG/ML
4 INJECTION INTRAMUSCULAR; INTRAVENOUS EVERY 6 HOURS PRN
Status: DISCONTINUED | OUTPATIENT
Start: 2018-08-17 | End: 2018-08-19 | Stop reason: HOSPADM

## 2018-08-17 RX ORDER — POTASSIUM CHLORIDE 7.45 MG/ML
10 INJECTION INTRAVENOUS PRN
Status: DISCONTINUED | OUTPATIENT
Start: 2018-08-17 | End: 2018-08-19 | Stop reason: HOSPADM

## 2018-08-17 RX ORDER — ALBUTEROL SULFATE 90 UG/1
2 AEROSOL, METERED RESPIRATORY (INHALATION) EVERY 6 HOURS PRN
Status: DISCONTINUED | OUTPATIENT
Start: 2018-08-17 | End: 2018-08-19 | Stop reason: HOSPADM

## 2018-08-17 RX ORDER — CETIRIZINE HYDROCHLORIDE 10 MG/1
10 TABLET ORAL DAILY
Status: DISCONTINUED | OUTPATIENT
Start: 2018-08-18 | End: 2018-08-19 | Stop reason: HOSPADM

## 2018-08-17 RX ORDER — ACETAMINOPHEN 325 MG/1
650 TABLET ORAL EVERY 4 HOURS PRN
Status: DISCONTINUED | OUTPATIENT
Start: 2018-08-17 | End: 2018-08-19 | Stop reason: HOSPADM

## 2018-08-17 RX ORDER — SODIUM CHLORIDE 0.9 % (FLUSH) 0.9 %
10 SYRINGE (ML) INJECTION EVERY 12 HOURS SCHEDULED
Status: DISCONTINUED | OUTPATIENT
Start: 2018-08-17 | End: 2018-08-19 | Stop reason: HOSPADM

## 2018-08-17 RX ORDER — ASPIRIN 81 MG/1
81 TABLET, CHEWABLE ORAL DAILY
Status: DISCONTINUED | OUTPATIENT
Start: 2018-08-18 | End: 2018-08-18

## 2018-08-17 RX ORDER — 0.9 % SODIUM CHLORIDE 0.9 %
1000 INTRAVENOUS SOLUTION INTRAVENOUS ONCE
Status: COMPLETED | OUTPATIENT
Start: 2018-08-17 | End: 2018-08-17

## 2018-08-17 RX ORDER — NITROGLYCERIN 0.4 MG/1
0.4 TABLET SUBLINGUAL EVERY 5 MIN PRN
Status: DISCONTINUED | OUTPATIENT
Start: 2018-08-17 | End: 2018-08-19 | Stop reason: HOSPADM

## 2018-08-17 RX ORDER — POTASSIUM CHLORIDE 20MEQ/15ML
40 LIQUID (ML) ORAL PRN
Status: DISCONTINUED | OUTPATIENT
Start: 2018-08-17 | End: 2018-08-19 | Stop reason: HOSPADM

## 2018-08-17 RX ORDER — PANTOPRAZOLE SODIUM 40 MG/1
40 TABLET, DELAYED RELEASE ORAL
Status: DISCONTINUED | OUTPATIENT
Start: 2018-08-18 | End: 2018-08-19 | Stop reason: HOSPADM

## 2018-08-17 RX ORDER — ADENOSINE 3 MG/ML
INJECTION, SOLUTION INTRAVENOUS
Status: COMPLETED
Start: 2018-08-17 | End: 2018-08-17

## 2018-08-17 RX ORDER — SODIUM CHLORIDE 9 MG/ML
INJECTION, SOLUTION INTRAVENOUS CONTINUOUS
Status: DISCONTINUED | OUTPATIENT
Start: 2018-08-17 | End: 2018-08-19 | Stop reason: HOSPADM

## 2018-08-17 RX ORDER — SODIUM CHLORIDE 0.9 % (FLUSH) 0.9 %
10 SYRINGE (ML) INJECTION PRN
Status: DISCONTINUED | OUTPATIENT
Start: 2018-08-17 | End: 2018-08-19 | Stop reason: HOSPADM

## 2018-08-17 RX ADMIN — ADENOSINE 6 MG: 3 INJECTION, SOLUTION INTRAVENOUS at 17:13

## 2018-08-17 RX ADMIN — METOCLOPRAMIDE 10 MG: 10 TABLET ORAL at 22:20

## 2018-08-17 RX ADMIN — SODIUM CHLORIDE 1000 ML: 9 INJECTION, SOLUTION INTRAVENOUS at 17:23

## 2018-08-17 ASSESSMENT — PAIN DESCRIPTION - FREQUENCY: FREQUENCY: CONTINUOUS

## 2018-08-17 ASSESSMENT — PAIN SCALES - GENERAL
PAINLEVEL_OUTOF10: 10
PAINLEVEL_OUTOF10: 0

## 2018-08-17 ASSESSMENT — ENCOUNTER SYMPTOMS
SHORTNESS OF BREATH: 0
DIARRHEA: 0
WHEEZING: 0
COUGH: 0
VOMITING: 0
NAUSEA: 0
BACK PAIN: 0
CONSTIPATION: 0
CHEST TIGHTNESS: 0
BLOOD IN STOOL: 0
RHINORRHEA: 0
SORE THROAT: 0
ABDOMINAL PAIN: 0

## 2018-08-17 ASSESSMENT — PAIN DESCRIPTION - LOCATION: LOCATION: CHEST

## 2018-08-17 ASSESSMENT — PAIN DESCRIPTION - PAIN TYPE: TYPE: ACUTE PAIN

## 2018-08-17 NOTE — ED PROVIDER NOTES
reflux disease); Hyperlipidemia; and Hypertension. SURGICAL HISTORY      has a past surgical history that includes Hysterectomy; eye surgery; Eye surgery; Colonoscopy (2009); and Upper gastrointestinal endoscopy (2016). CURRENT MEDICATIONS       Previous Medications    ALBUTEROL SULFATE HFA (PROVENTIL HFA) 108 (90 BASE) MCG/ACT INHALER    Inhale 2 puffs into the lungs every 6 hours as needed for Wheezing    CARBAMIDE PEROXIDE (DEBROX) 6.5 % OTIC SOLUTION    Place 5 drops into the right ear 2 times daily    CETIRIZINE (ZYRTEC ALLERGY) 10 MG TABLET    Take 1 tablet by mouth daily    FLUTICASONE (FLONASE) 50 MCG/ACT NASAL SPRAY    1 spray by Nasal route daily for 20 days    METOCLOPRAMIDE (REGLAN) 10 MG TABLET    Take 10 mg by mouth 4 times daily    PANTOPRAZOLE (PROTONIX) 40 MG TABLET    Take 1 tablet by mouth 2 times daily (before meals)    ROSUVASTATIN (CRESTOR) 40 MG TABLET    Take 1 tablet by mouth once a week    SODIUM CHLORIDE (ALTAMIST SPRAY) 0.65 % NASAL SPRAY    1 spray by Nasal route as needed for Congestion       ALLERGIES     is allergic to cephalexin; estrogens; prednisone; statins; and sulfa antibiotics. FAMILY HISTORY     indicated that her mother is . She indicated that her father is . She indicated that her sister is . She indicated that the status of her neg hx is unknown.    family history includes Breast Cancer in her sister; Diabetes in her mother; Heart Disease in her mother; High Blood Pressure in her father and mother. SOCIAL HISTORY      reports that she quit smoking about 28 years ago. She has never used smokeless tobacco. She reports that she does not drink alcohol or use drugs. PHYSICAL EXAM     INITIAL VITALS:  height is 5' 2\" (1.575 m) and weight is 191 lb (86.6 kg). Her oral temperature is 98.8 °F (37.1 °C). Her blood pressure is 162/90 (abnormal) and her pulse is 78. Her respiration is 17 and oxygen saturation is 98%.     Physical Exam 100% 98%   Weight: 191 lb (86.6 kg)     Height: 5' 2\" (1.575 m)         5:11 PM: The patient was seen and evaluated. MDM:  Patient was brought to the emergency department. We tried Valsalva maneuver first which did not work. Patient was given 6 mg of adenosine and patient's heart rate came back to normal. Patient d-dimer and troponin was negative. Patient will be admitted by Dr. Gabriel Stroud for further workup and management. All of patient's lab the testing that reviewed. CRITICAL CARE:   There was a high probability of clinically significant/life threatening deterioration in this patient's condition which required my urgent intervention. Total critical care time was15 minutes. This excludes any time for separately reportable procedures. CONSULTS:  Dr. Corie Clinton:       FINAL IMPRESSION      1. Chest pain, unspecified type    2. Paroxysmal supraventricular tachycardia [facility])          DISPOSITION/PLAN   Admit    PATIENT REFERRED TO:  DO Juan [place] [address]  [phone]            DISCHARGE MEDICATIONS:  New Prescriptions    No medications on file       (Please note that portions of this note were completed with a voice recognition program.  Efforts were made to edit the dictations but occasionally words are mis-transcribed.)    Scribe:  Christina Akins 8/17/18 5:15 PM Scribing for and in the presence of Khris Dumont MD.    Signed by: Kendall Wood, 08/17/18 6:20 PM    Provider:  I personally performed the services described in the documentation, reviewed and edited the documentation which was dictated to the scribe in my presence, and it accurately records my words and actions.     Khris Dumont MD 8/17/18 6:20 PM       Khris Dumont MD  08/17/18 [id number]

## 2018-08-17 NOTE — ED NOTES
EKG completed when patient arrived to ED. Dr. Danielle Garcia given.       Jelani Monte, RN  08/17/18 [id number]

## 2018-08-18 ENCOUNTER — APPOINTMENT (OUTPATIENT)
Dept: CT IMAGING | Age: 58
DRG: 309 | End: 2018-08-18
Payer: MEDICARE

## 2018-08-18 PROBLEM — I21.4 MI, ACUTE, NON ST SEGMENT ELEVATION (HCC): Status: ACTIVE | Noted: 2018-08-18

## 2018-08-18 LAB
ALBUMIN SERPL-MCNC: 3.9 G/DL (ref 3.5–5.1)
ALP BLD-CCNC: 52 U/L (ref 38–126)
ALT SERPL-CCNC: 24 U/L (ref 11–66)
AST SERPL-CCNC: 30 U/L (ref 5–40)
BILIRUB SERPL-MCNC: 0.3 MG/DL (ref 0.3–1.2)
BILIRUBIN DIRECT: < 0.2 MG/DL (ref 0–0.3)
CHOLESTEROL, TOTAL: 237 MG/DL (ref 100–199)
EKG ATRIAL RATE: 73 BPM
EKG ATRIAL RATE: 78 BPM
EKG ATRIAL RATE: 83 BPM
EKG P AXIS: 40 DEGREES
EKG P AXIS: 44 DEGREES
EKG P-R INTERVAL: 170 MS
EKG P-R INTERVAL: 170 MS
EKG Q-T INTERVAL: 346 MS
EKG Q-T INTERVAL: 450 MS
EKG Q-T INTERVAL: 460 MS
EKG QRS DURATION: 96 MS
EKG QRS DURATION: 98 MS
EKG QRS DURATION: 98 MS
EKG QTC CALCULATION (BAZETT): 506 MS
EKG QTC CALCULATION (BAZETT): 528 MS
EKG QTC CALCULATION (BAZETT): 548 MS
EKG R AXIS: -2 DEGREES
EKG R AXIS: 0 DEGREES
EKG R AXIS: 7 DEGREES
EKG T AXIS: 165 DEGREES
EKG T AXIS: 170 DEGREES
EKG T AXIS: 170 DEGREES
EKG VENTRICULAR RATE: 151 BPM
EKG VENTRICULAR RATE: 73 BPM
EKG VENTRICULAR RATE: 83 BPM
ERYTHROCYTE [DISTWIDTH] IN BLOOD BY AUTOMATED COUNT: 13.8 % (ref 11.5–14.5)
ERYTHROCYTE [DISTWIDTH] IN BLOOD BY AUTOMATED COUNT: 43.7 FL (ref 35–45)
HCT VFR BLD CALC: 36.2 % (ref 37–47)
HDLC SERPL-MCNC: 44 MG/DL
HEMOGLOBIN: 11.7 GM/DL (ref 12–16)
LDL CHOLESTEROL CALCULATED: 150 MG/DL
LV EF: 50 %
LVEF MODALITY: NORMAL
MCH RBC QN AUTO: 28 PG (ref 26–33)
MCHC RBC AUTO-ENTMCNC: 32.3 GM/DL (ref 32.2–35.5)
MCV RBC AUTO: 86.6 FL (ref 81–99)
PLATELET # BLD: 283 THOU/MM3 (ref 130–400)
PMV BLD AUTO: 11 FL (ref 9.4–12.4)
RBC # BLD: 4.18 MILL/MM3 (ref 4.2–5.4)
TOTAL PROTEIN: 7 G/DL (ref 6.1–8)
TRIGL SERPL-MCNC: 213 MG/DL (ref 0–199)
TROPONIN T: 0.02 NG/ML
TROPONIN T: 0.03 NG/ML
TROPONIN T: 0.04 NG/ML
TROPONIN T: < 0.01 NG/ML
WBC # BLD: 5.1 THOU/MM3 (ref 4.8–10.8)

## 2018-08-18 PROCEDURE — 93010 ELECTROCARDIOGRAM REPORT: CPT | Performed by: NUCLEAR MEDICINE

## 2018-08-18 PROCEDURE — 84484 ASSAY OF TROPONIN QUANT: CPT

## 2018-08-18 PROCEDURE — 85027 COMPLETE CBC AUTOMATED: CPT

## 2018-08-18 PROCEDURE — 80061 LIPID PANEL: CPT

## 2018-08-18 PROCEDURE — 1200000003 HC TELEMETRY R&B

## 2018-08-18 PROCEDURE — 2580000003 HC RX 258: Performed by: INTERNAL MEDICINE

## 2018-08-18 PROCEDURE — 80076 HEPATIC FUNCTION PANEL: CPT

## 2018-08-18 PROCEDURE — 71260 CT THORAX DX C+: CPT

## 2018-08-18 PROCEDURE — 2709999900 HC NON-CHARGEABLE SUPPLY

## 2018-08-18 PROCEDURE — 6360000004 HC RX CONTRAST MEDICATION: Performed by: INTERNAL MEDICINE

## 2018-08-18 PROCEDURE — 36415 COLL VENOUS BLD VENIPUNCTURE: CPT

## 2018-08-18 PROCEDURE — 6370000000 HC RX 637 (ALT 250 FOR IP): Performed by: INTERNAL MEDICINE

## 2018-08-18 PROCEDURE — 93005 ELECTROCARDIOGRAM TRACING: CPT | Performed by: INTERNAL MEDICINE

## 2018-08-18 PROCEDURE — 93306 TTE W/DOPPLER COMPLETE: CPT

## 2018-08-18 RX ORDER — ASPIRIN 325 MG
325 TABLET ORAL DAILY
Status: DISCONTINUED | OUTPATIENT
Start: 2018-08-18 | End: 2018-08-19 | Stop reason: HOSPADM

## 2018-08-18 RX ORDER — DILTIAZEM HYDROCHLORIDE 120 MG/1
120 CAPSULE, COATED, EXTENDED RELEASE ORAL DAILY
Status: DISCONTINUED | OUTPATIENT
Start: 2018-08-18 | End: 2018-08-19 | Stop reason: HOSPADM

## 2018-08-18 RX ADMIN — PANTOPRAZOLE SODIUM 40 MG: 40 TABLET, DELAYED RELEASE ORAL at 09:22

## 2018-08-18 RX ADMIN — METOCLOPRAMIDE 10 MG: 10 TABLET ORAL at 09:22

## 2018-08-18 RX ADMIN — Medication 10 ML: at 09:22

## 2018-08-18 RX ADMIN — ASPIRIN 325 MG: 325 TABLET ORAL at 16:56

## 2018-08-18 RX ADMIN — DILTIAZEM HYDROCHLORIDE 120 MG: 120 CAPSULE, COATED, EXTENDED RELEASE ORAL at 09:22

## 2018-08-18 RX ADMIN — PANTOPRAZOLE SODIUM 40 MG: 40 TABLET, DELAYED RELEASE ORAL at 16:56

## 2018-08-18 RX ADMIN — METOCLOPRAMIDE 10 MG: 10 TABLET ORAL at 16:56

## 2018-08-18 RX ADMIN — IOPAMIDOL 80 ML: 755 INJECTION, SOLUTION INTRAVENOUS at 23:44

## 2018-08-18 ASSESSMENT — PAIN SCALES - GENERAL
PAINLEVEL_OUTOF10: 0

## 2018-08-18 NOTE — H&P
[address] [place] [address]                               HISTORY AND PHYSICAL    PATIENT NAME: Chante Bardales                      :        1960  MED REC NO:   [id number]                           ROOM:       0765  ACCOUNT NO:   [de-identified]                           ADMIT DATE: 2018  PROVIDER:     Jerrell Grider M.D. COMBINED H AND P AND SHORT STAY SUMMARY    FINAL DIAGNOSES:  1. Atrial flutter with rapid ventricular response. 2.  Atypical chest pain. 3.  Hypertensive cardiovascular disease. 4.  History of pituitary tumor cyst apparently. 5.  The patient is somewhat overweight. 6.  History of hypertension. 7.  History of dyslipidemia. 8.  History of gastroesophageal reflux. BRIEF HISTORY:  This is a patient who is a 58-year-old lady who was  admitted to the hospital through the emergency room. The patient came to  the emergency room because of the chest pain. She described pain as  tightness in the upper part of the chest.  She also describes some  palpitation. She was in atrial flutter, rapid ventricular response when  she came in, and she indicates she has been having the palpitations on and  off. She does not seem to be bothered with it. The patient has no history  of dizziness or syncopal episode. Apparently, she had a heart cath over a  year and half ago by another cardiologist.  The patient has mild  nonobstructive coronary artery disease at that time. The patient has no  history of congestive heart failure. Her EKG showed LVH with a strain  pattern. She is not aware of valvular heart disease. REVIEW OF THE SYSTEMS:  She had a history of brain cyst possible pituitary  tumor cyst; I do not have the record for that. She lost vision in the left  eye and peripherally on the right eye. She has history of cataract  surgery. This patient has history of gastroesophageal reflux.   She had  been on EP study and possible ablation  treatment. With her having a heart cath a year ago and no new EKG changes,  I believe the mild elevation of troponin is related to her arrhythmia. The  patient will be ambulated. If she continued to be _____ she will be  discharged home. She is to follow up in the office. Brandon Loza M.D.    D: 08/18/2018 8:53:01       T: 08/18/2018 8:55:09     [id number]  Job#: [id number]     Doc#: [id number]    CC:  Connor Falk M.D.

## 2018-08-19 ENCOUNTER — TELEPHONE (OUTPATIENT)
Dept: FAMILY MEDICINE CLINIC | Age: 58
End: 2018-08-19

## 2018-08-19 VITALS
OXYGEN SATURATION: 97 % | DIASTOLIC BLOOD PRESSURE: 77 MMHG | WEIGHT: 194.6 LBS | TEMPERATURE: 97.3 F | HEART RATE: 61 BPM | BODY MASS INDEX: 35.81 KG/M2 | SYSTOLIC BLOOD PRESSURE: 138 MMHG | HEIGHT: 62 IN | RESPIRATION RATE: 18 BRPM

## 2018-08-19 PROCEDURE — 2580000003 HC RX 258: Performed by: INTERNAL MEDICINE

## 2018-08-19 PROCEDURE — 6370000000 HC RX 637 (ALT 250 FOR IP): Performed by: INTERNAL MEDICINE

## 2018-08-19 RX ORDER — DILTIAZEM HYDROCHLORIDE 120 MG/1
120 CAPSULE, COATED, EXTENDED RELEASE ORAL DAILY
Qty: 30 CAPSULE | Refills: 3 | Status: SHIPPED | OUTPATIENT
Start: 2018-08-20 | End: 2019-06-19

## 2018-08-19 RX ORDER — NITROGLYCERIN 0.4 MG/1
TABLET SUBLINGUAL
Qty: 25 TABLET | Refills: 3 | Status: SHIPPED | OUTPATIENT
Start: 2018-08-19

## 2018-08-19 RX ORDER — ASPIRIN 325 MG
325 TABLET ORAL DAILY
Qty: 30 TABLET | Refills: 3 | Status: SHIPPED | OUTPATIENT
Start: 2018-08-20 | End: 2019-06-19

## 2018-08-19 RX ORDER — METOPROLOL SUCCINATE 25 MG/1
25 TABLET, EXTENDED RELEASE ORAL DAILY
Qty: 30 TABLET | Refills: 3 | Status: SHIPPED | OUTPATIENT
Start: 2018-08-19 | End: 2018-08-27

## 2018-08-19 RX ADMIN — METOCLOPRAMIDE 10 MG: 10 TABLET ORAL at 10:05

## 2018-08-19 RX ADMIN — ASPIRIN 325 MG: 325 TABLET ORAL at 10:07

## 2018-08-19 RX ADMIN — DILTIAZEM HYDROCHLORIDE 120 MG: 120 CAPSULE, COATED, EXTENDED RELEASE ORAL at 10:07

## 2018-08-19 RX ADMIN — PANTOPRAZOLE SODIUM 40 MG: 40 TABLET, DELAYED RELEASE ORAL at 06:50

## 2018-08-19 RX ADMIN — Medication 10 ML: at 10:07

## 2018-08-19 RX ADMIN — ROSUVASTATIN CALCIUM 40 MG: 10 TABLET, COATED ORAL at 10:07

## 2018-08-19 ASSESSMENT — PAIN SCALES - GENERAL
PAINLEVEL_OUTOF10: 0

## 2018-08-19 NOTE — PROGRESS NOTES
Discharge teaching and instructions for diagnosis/procedure of chest pain completed with patient using teachback method. AVS reviewed. Patient voiced understanding regarding prescriptions, follow up appointments, and care of self at home. Discharged in a wheelchair to  independent living per family.

## 2018-08-19 NOTE — PROGRESS NOTES
Spoke to Zachary Boggs with ext [phone], and she stated they will call Dr Kenya Rios office on Tuesday to schedule a follow up within 2-3 weeks, and the office will call patient with appointment.

## 2018-08-19 NOTE — PLAN OF CARE
Problem: Discharge Planning:  Goal: Discharged to appropriate level of care  Discharged to appropriate level of care   Outcome: Ongoing  Patient plans to return home at discharge. Patient has no new needs at home. Problem: Cardiac Output - Decreased:  Goal: Hemodynamic stability will improve  Hemodynamic stability will improve   Outcome: Ongoing  Patient is hemodynamically stable, blood pressure is within normal parameters, heart sounds within normal limits. Problem: Pain:  Goal: Pain level will decrease  Pain level will decrease   Outcome: Ongoing  Patient voices pain 0/10. Patients pain goal is 0/10. PRN pain medications given as ordered. Problem: Tissue Perfusion - Cardiopulmonary, Altered:  Goal: Absence of angina  Absence of angina   Outcome: Ongoing  Patient chest pain free this shift. If chest pain is voiced EKG ordered and MD notified. Comments: Care plan reviewed with patient. Patient verbalize understanding of the plan of care and contribute to goal setting.

## 2018-08-20 NOTE — TELEPHONE ENCOUNTER
[facility] Transitions Initial Follow Up Call    Outreach made within 2 business days of discharge: Yes    Patient: Brooklyn Franco Patient : 1960   MRN: [id number]  Reason for Admission: There are no discharge diagnoses documented for the most recent discharge. Discharge Date: 18       Spoke with: Pt      TCM Interactive Patient Contact:  Was patient able to fill all prescriptions: No: pt is going to pick them up  Was patient instructed to bring all medications to the follow-up visit: Yes  Is patient taking all medications as directed in the discharge summary? Yes  Does patient understand their discharge instructions: Yes  Does patient have questions or concerns that need addressed prior to 7-14 day follow up office visit: yes - Pt would like to know why she was put on Aspirin 326 mg and Diltiazem 120 mg. She said that her BP returned to normal before discharge and her sxs had subsided. She just wants clarification before she starts to take this medication. Please advise.      Scheduled appointment with PCP within 7-14 days    Follow Up  Future Appointments  Date Time Provider Michelle Engle   2018 3:30 PM Leah Ruby  Picher JIM Garcia ([facility])

## 2018-08-27 ENCOUNTER — OFFICE VISIT (OUTPATIENT)
Dept: FAMILY MEDICINE CLINIC | Age: 58
End: 2018-08-27
Payer: MEDICARE

## 2018-08-27 VITALS
HEART RATE: 88 BPM | BODY MASS INDEX: 34.02 KG/M2 | WEIGHT: 192 LBS | SYSTOLIC BLOOD PRESSURE: 128 MMHG | HEIGHT: 63 IN | RESPIRATION RATE: 12 BRPM | TEMPERATURE: 98.8 F | DIASTOLIC BLOOD PRESSURE: 78 MMHG

## 2018-08-27 DIAGNOSIS — I47.1 SVT (SUPRAVENTRICULAR TACHYCARDIA) (HCC): Primary | ICD-10-CM

## 2018-08-27 PROCEDURE — 99495 TRANSJ CARE MGMT MOD F2F 14D: CPT | Performed by: FAMILY MEDICINE

## 2018-08-27 NOTE — PROGRESS NOTES
Visit Information    Have you changed or started any medications since your last visit including any over-the-counter medicines, vitamins, or herbal medicines? no   Are you having any side effects from any of your medications? -  no  Have you stopped taking any of your medications? Is so, why? -  no    Have you seen any other physician or provider since your last visit? Yes - Records Obtained  Have you had any other diagnostic tests since your last visit? Yes - Records Obtained  Have you been seen in the emergency room and/or had an admission to a hospital since we last saw you? Yes - Records Obtained  Have you had your routine dental cleaning in the past 6 months? no    Have you activated your [employer] account? If not, what are your barriers?  Yes     Patient Care Team:  Jason Camarillo DO as PCP - General (Family Medicine)    Medical History Review  Past Medical, Family, and Social History reviewed and does contribute to the patient presenting condition    Health Maintenance   Topic Date Due    Hepatitis C screen  1960    HIV screen  06/18/1974    DTaP/Tdap/Td vaccine (1 - Tdap) 06/18/1978    Shingles Vaccine (1 of 2 - 2 Dose Series) 06/18/2009    A1C test (Diabetic or Prediabetic)  08/21/2016    Breast cancer screen  11/17/2017    Flu vaccine (1) 09/01/2018    Potassium monitoring  08/17/2019    Creatinine monitoring  08/17/2019    Cervical cancer screen  12/21/2020    Colon cancer screen colonoscopy  12/15/2022    Lipid screen  08/18/2023

## 2018-09-11 ENCOUNTER — OFFICE VISIT (OUTPATIENT)
Dept: FAMILY MEDICINE CLINIC | Age: 58
End: 2018-09-11
Payer: MEDICARE

## 2018-09-11 VITALS
DIASTOLIC BLOOD PRESSURE: 78 MMHG | TEMPERATURE: 98.4 F | WEIGHT: 192.2 LBS | RESPIRATION RATE: 14 BRPM | HEART RATE: 81 BPM | SYSTOLIC BLOOD PRESSURE: 152 MMHG | BODY MASS INDEX: 35.37 KG/M2 | HEIGHT: 62 IN

## 2018-09-11 DIAGNOSIS — I10 ESSENTIAL HYPERTENSION: ICD-10-CM

## 2018-09-11 DIAGNOSIS — I47.1 SVT (SUPRAVENTRICULAR TACHYCARDIA) (HCC): Primary | ICD-10-CM

## 2018-09-11 PROBLEM — I21.4 MI, ACUTE, NON ST SEGMENT ELEVATION (HCC): Status: RESOLVED | Noted: 2018-08-18 | Resolved: 2018-09-11

## 2018-09-11 PROCEDURE — 1036F TOBACCO NON-USER: CPT | Performed by: FAMILY MEDICINE

## 2018-09-11 PROCEDURE — 99213 OFFICE O/P EST LOW 20 MIN: CPT | Performed by: FAMILY MEDICINE

## 2018-09-11 PROCEDURE — 3017F COLORECTAL CA SCREEN DOC REV: CPT | Performed by: FAMILY MEDICINE

## 2018-09-11 PROCEDURE — G8598 ASA/ANTIPLAT THER USED: HCPCS | Performed by: FAMILY MEDICINE

## 2018-09-11 PROCEDURE — G8427 DOCREV CUR MEDS BY ELIG CLIN: HCPCS | Performed by: FAMILY MEDICINE

## 2018-09-11 PROCEDURE — G8417 CALC BMI ABV UP PARAM F/U: HCPCS | Performed by: FAMILY MEDICINE

## 2018-09-11 PROCEDURE — 1111F DSCHRG MED/CURRENT MED MERGE: CPT | Performed by: FAMILY MEDICINE

## 2018-09-11 NOTE — PROGRESS NOTES
or go to ER if any worsening of symptoms      Return in about 4 weeks (around 10/9/2018), or if symptoms worsen or fail to improve. Controlled Substances Monitoring:                     Montserrat received counseling on the following healthy behaviors: medication adherence  Reviewed prior labs and health maintenance. Continue current medications, diet and exercise. Discussed use, benefit, and side effects of prescribed medications. Barriers to medication compliance addressed. Patient given educational materials - see patient instructions. All patient questions answered. Patient voiced understanding.

## 2018-09-26 PROBLEM — R77.8 ELEVATED TROPONIN: Status: RESOLVED | Noted: 2017-05-06 | Resolved: 2018-09-26

## 2018-09-26 PROBLEM — R79.89 ELEVATED TROPONIN: Status: RESOLVED | Noted: 2017-05-06 | Resolved: 2018-09-26

## 2018-10-10 ENCOUNTER — TELEPHONE (OUTPATIENT)
Dept: NEUROSURGERY | Age: 58
End: 2018-10-10

## 2018-10-17 ENCOUNTER — HOSPITAL ENCOUNTER (OUTPATIENT)
Dept: MRI IMAGING | Age: 58
Discharge: HOME | End: 2018-10-17
Payer: MEDICARE

## 2018-10-17 ENCOUNTER — TELEPHONE (OUTPATIENT)
Dept: FAMILY MEDICINE CLINIC | Age: 58
End: 2018-10-17

## 2018-10-17 DIAGNOSIS — Q01.9 ENCEPHALOCELE (HCC): ICD-10-CM

## 2018-10-17 PROCEDURE — 70553 MRI BRAIN STEM W/O & W/DYE: CPT

## 2018-10-17 PROCEDURE — 6360000004 HC RX CONTRAST MEDICATION: Performed by: NURSE PRACTITIONER

## 2018-10-17 PROCEDURE — A9579 GAD-BASE MR CONTRAST NOS,1ML: HCPCS | Performed by: NURSE PRACTITIONER

## 2018-10-17 RX ADMIN — GADOTERIDOL 15 ML: 279.3 INJECTION, SOLUTION INTRAVENOUS at 13:53

## 2018-10-22 RX ORDER — PANTOPRAZOLE SODIUM 40 MG/1
40 TABLET, DELAYED RELEASE ORAL
Qty: 60 TABLET | Refills: 5 | Status: SHIPPED | OUTPATIENT
Start: 2018-10-22 | End: 2019-06-19

## 2018-11-27 ENCOUNTER — TELEPHONE (OUTPATIENT)
Dept: FAMILY MEDICINE CLINIC | Age: 58
End: 2018-11-27

## 2018-11-28 ENCOUNTER — OFFICE VISIT (OUTPATIENT)
Dept: FAMILY MEDICINE CLINIC | Age: 58
End: 2018-11-28
Payer: MEDICARE

## 2018-11-28 ENCOUNTER — NURSE ONLY (OUTPATIENT)
Dept: LAB | Age: 58
End: 2018-11-28

## 2018-11-28 ENCOUNTER — TELEPHONE (OUTPATIENT)
Dept: FAMILY MEDICINE CLINIC | Age: 58
End: 2018-11-28

## 2018-11-28 VITALS
SYSTOLIC BLOOD PRESSURE: 126 MMHG | TEMPERATURE: 98.5 F | BODY MASS INDEX: 34.02 KG/M2 | HEART RATE: 76 BPM | HEIGHT: 63 IN | RESPIRATION RATE: 16 BRPM | WEIGHT: 192 LBS | DIASTOLIC BLOOD PRESSURE: 82 MMHG

## 2018-11-28 DIAGNOSIS — Z13.220 SCREENING CHOLESTEROL LEVEL: ICD-10-CM

## 2018-11-28 DIAGNOSIS — Z13.1 SCREENING FOR DIABETES MELLITUS: ICD-10-CM

## 2018-11-28 DIAGNOSIS — J06.9 UPPER RESPIRATORY TRACT INFECTION, UNSPECIFIED TYPE: Primary | ICD-10-CM

## 2018-11-28 LAB
CHOLESTEROL, TOTAL: 190 MG/DL (ref 100–199)
GLUCOSE BLD-MCNC: 102 MG/DL (ref 70–108)
HDLC SERPL-MCNC: 53 MG/DL
LDL CHOLESTEROL CALCULATED: 105 MG/DL
TRIGL SERPL-MCNC: 161 MG/DL (ref 0–199)

## 2018-11-28 PROCEDURE — G8427 DOCREV CUR MEDS BY ELIG CLIN: HCPCS | Performed by: FAMILY MEDICINE

## 2018-11-28 PROCEDURE — 1036F TOBACCO NON-USER: CPT | Performed by: FAMILY MEDICINE

## 2018-11-28 PROCEDURE — 3017F COLORECTAL CA SCREEN DOC REV: CPT | Performed by: FAMILY MEDICINE

## 2018-11-28 PROCEDURE — G8417 CALC BMI ABV UP PARAM F/U: HCPCS | Performed by: FAMILY MEDICINE

## 2018-11-28 PROCEDURE — 99213 OFFICE O/P EST LOW 20 MIN: CPT | Performed by: FAMILY MEDICINE

## 2018-11-28 PROCEDURE — G8484 FLU IMMUNIZE NO ADMIN: HCPCS | Performed by: FAMILY MEDICINE

## 2018-11-28 PROCEDURE — G8598 ASA/ANTIPLAT THER USED: HCPCS | Performed by: FAMILY MEDICINE

## 2018-11-28 RX ORDER — AMOXICILLIN 500 MG/1
500 CAPSULE ORAL 3 TIMES DAILY
Qty: 30 CAPSULE | Refills: 0 | Status: SHIPPED | OUTPATIENT
Start: 2018-11-28 | End: 2018-12-08

## 2018-11-28 NOTE — PROGRESS NOTES
SUBJECTIVE:  Liz Garvey is a 60 y.o. y/o female that presents with Sinus Problem (started monday ); Pharyngitis; and Discuss Labs (wants  labs  for  cholesterol aand  dm )  . HPI:      Symptoms have been present for 3 day(s). Symptoms are worse since they initially started. Fever? No  Runny nose or congestion? Yes - with post nasal drip   Cough? Yes - mostly at night  Sore throat? Yes - 'some'  Headache, fatigue, joint pains, muscle aches? Yes - severe bilateral maxillary pressure  Shortness of breath/Wheezing? No  Nausea/Vomiting/Diarrhea? No  Double Sickening? No  Sick contacts? No  Has not tried any OTC meds    Past Medical History:   Diagnosis Date    Brain cyst     GERD (gastroesophageal reflux disease)     Hyperlipidemia     Hypertension        Social History     Social History    Marital status:      Spouse name: N/A    Number of children: N/A    Years of education: N/A     Occupational History    Not on file. Social History Main Topics    Smoking status: Former Smoker     Packs/day: 1.00     Years: 10.00     Quit date: 1/1/1990    Smokeless tobacco: Never Used    Alcohol use No    Drug use: No    Sexual activity: Yes     Partners: Male     Other Topics Concern    Not on file     Social History Narrative    No narrative on file       Family History   Problem Relation Age of Onset    Heart Disease Mother     High Blood Pressure Mother     Diabetes Mother     High Blood Pressure Father     Breast Cancer Sister     Colon Cancer Neg Hx     Cancer Neg Hx            OBJECTIVE:  /82   Pulse 76   Temp 98.5 °F (36.9 °C) (Oral)   Resp 16   Ht 5' 3\" (1.6 m)   Wt 192 lb (87.1 kg)   BMI 34.01 kg/m²   General appearance: alert, well appearing, and in no distress. ENT exam reveals - neck without nodes, pharynx erythematous without exudate and nasal mucosa congested.    CVS exam: normal rate, regular rhythm, normal S1, S2, no murmurs, rubs, clicks or

## 2018-12-17 ENCOUNTER — TELEPHONE (OUTPATIENT)
Dept: FAMILY MEDICINE CLINIC | Age: 58
End: 2018-12-17

## 2018-12-17 DIAGNOSIS — J01.90 ACUTE RHINOSINUSITIS: Primary | ICD-10-CM

## 2018-12-17 RX ORDER — DOXYCYCLINE HYCLATE 100 MG
100 TABLET ORAL 2 TIMES DAILY
Qty: 14 TABLET | Refills: 0 | Status: SHIPPED | OUTPATIENT
Start: 2018-12-17 | End: 2018-12-24

## 2019-02-25 DIAGNOSIS — K31.84 GASTROPARESIS: Primary | ICD-10-CM

## 2019-02-25 RX ORDER — METOCLOPRAMIDE 10 MG/1
10 TABLET ORAL 4 TIMES DAILY
Qty: 120 TABLET | Refills: 3 | Status: SHIPPED | OUTPATIENT
Start: 2019-02-25 | End: 2019-06-19

## 2019-02-25 RX ORDER — ROSUVASTATIN CALCIUM 40 MG/1
40 TABLET, COATED ORAL WEEKLY
Qty: 4 TABLET | Refills: 5 | Status: SHIPPED | OUTPATIENT
Start: 2019-02-25 | End: 2019-09-16 | Stop reason: SDUPTHER

## 2019-09-17 RX ORDER — ROSUVASTATIN CALCIUM 40 MG/1
TABLET, COATED ORAL
Qty: 4 TABLET | Refills: 5 | Status: SHIPPED | OUTPATIENT
Start: 2019-09-17 | End: 2019-11-04 | Stop reason: SDUPTHER

## 2019-10-22 ENCOUNTER — ANESTHESIA EVENT (OUTPATIENT)
Dept: ENDOSCOPY | Age: 59
End: 2019-10-22
Payer: MEDICARE

## 2019-10-22 ENCOUNTER — HOSPITAL ENCOUNTER (OUTPATIENT)
Age: 59
Setting detail: OUTPATIENT SURGERY
Discharge: HOME | End: 2019-10-22
Attending: INTERNAL MEDICINE | Admitting: INTERNAL MEDICINE
Payer: MEDICARE

## 2019-10-22 ENCOUNTER — ANESTHESIA (OUTPATIENT)
Dept: ENDOSCOPY | Age: 59
End: 2019-10-22
Payer: MEDICARE

## 2019-10-22 VITALS
WEIGHT: 195 LBS | OXYGEN SATURATION: 98 % | SYSTOLIC BLOOD PRESSURE: 143 MMHG | RESPIRATION RATE: 20 BRPM | DIASTOLIC BLOOD PRESSURE: 84 MMHG | TEMPERATURE: 97.8 F | HEART RATE: 60 BPM | HEIGHT: 62 IN | BODY MASS INDEX: 35.88 KG/M2

## 2019-10-22 VITALS
DIASTOLIC BLOOD PRESSURE: 56 MMHG | RESPIRATION RATE: 17 BRPM | OXYGEN SATURATION: 99 % | SYSTOLIC BLOOD PRESSURE: 108 MMHG

## 2019-10-22 PROCEDURE — 6360000002 HC RX W HCPCS: Performed by: ANESTHESIOLOGY

## 2019-10-22 PROCEDURE — 2709999900 HC NON-CHARGEABLE SUPPLY: Performed by: INTERNAL MEDICINE

## 2019-10-22 PROCEDURE — 7100000001 HC PACU RECOVERY - ADDTL 15 MIN: Performed by: INTERNAL MEDICINE

## 2019-10-22 PROCEDURE — 2580000003 HC RX 258: Performed by: INTERNAL MEDICINE

## 2019-10-22 PROCEDURE — 3700000001 HC ADD 15 MINUTES (ANESTHESIA): Performed by: INTERNAL MEDICINE

## 2019-10-22 PROCEDURE — 7100000000 HC PACU RECOVERY - FIRST 15 MIN: Performed by: INTERNAL MEDICINE

## 2019-10-22 PROCEDURE — 2500000003 HC RX 250 WO HCPCS: Performed by: ANESTHESIOLOGY

## 2019-10-22 PROCEDURE — 3700000000 HC ANESTHESIA ATTENDED CARE: Performed by: INTERNAL MEDICINE

## 2019-10-22 PROCEDURE — 3609027000 HC COLONOSCOPY: Performed by: INTERNAL MEDICINE

## 2019-10-22 RX ORDER — LIDOCAINE HYDROCHLORIDE 20 MG/ML
INJECTION, SOLUTION INFILTRATION; PERINEURAL PRN
Status: DISCONTINUED | OUTPATIENT
Start: 2019-10-22 | End: 2019-10-22 | Stop reason: SDUPTHER

## 2019-10-22 RX ORDER — PANTOPRAZOLE SODIUM 20 MG/1
20 TABLET, DELAYED RELEASE ORAL DAILY
COMMUNITY
End: 2020-03-11

## 2019-10-22 RX ORDER — SODIUM CHLORIDE 450 MG/100ML
INJECTION, SOLUTION INTRAVENOUS CONTINUOUS
Status: DISCONTINUED | OUTPATIENT
Start: 2019-10-22 | End: 2019-10-22 | Stop reason: HOSPADM

## 2019-10-22 RX ORDER — PROPOFOL 10 MG/ML
INJECTION, EMULSION INTRAVENOUS PRN
Status: DISCONTINUED | OUTPATIENT
Start: 2019-10-22 | End: 2019-10-22 | Stop reason: SDUPTHER

## 2019-10-22 RX ADMIN — SODIUM CHLORIDE: 4.5 INJECTION, SOLUTION INTRAVENOUS at 08:35

## 2019-10-22 RX ADMIN — PROPOFOL 50 MG: 10 INJECTION, EMULSION INTRAVENOUS at 09:48

## 2019-10-22 RX ADMIN — PROPOFOL 50 MG: 10 INJECTION, EMULSION INTRAVENOUS at 10:03

## 2019-10-22 RX ADMIN — LIDOCAINE HYDROCHLORIDE 60 MG: 20 INJECTION, SOLUTION INFILTRATION; PERINEURAL at 09:48

## 2019-10-22 RX ADMIN — PROPOFOL 50 MG: 10 INJECTION, EMULSION INTRAVENOUS at 09:55

## 2019-10-22 RX ADMIN — PROPOFOL 10 MG: 10 INJECTION, EMULSION INTRAVENOUS at 09:53

## 2019-10-22 RX ADMIN — PROPOFOL 30 MG: 10 INJECTION, EMULSION INTRAVENOUS at 09:52

## 2019-10-22 RX ADMIN — PROPOFOL 30 MG: 10 INJECTION, EMULSION INTRAVENOUS at 09:54

## 2019-10-22 RX ADMIN — PROPOFOL 30 MG: 10 INJECTION, EMULSION INTRAVENOUS at 09:56

## 2019-10-22 ASSESSMENT — PAIN - FUNCTIONAL ASSESSMENT: PAIN_FUNCTIONAL_ASSESSMENT: 0-10

## 2019-10-30 RX ORDER — PANTOPRAZOLE SODIUM 40 MG/1
TABLET, DELAYED RELEASE ORAL
Qty: 60 TABLET | Refills: 5 | Status: SHIPPED | OUTPATIENT
Start: 2019-10-30 | End: 2020-03-11 | Stop reason: SDUPTHER

## 2019-11-04 RX ORDER — ROSUVASTATIN CALCIUM 40 MG/1
TABLET, COATED ORAL
Qty: 90 TABLET | Refills: 3 | Status: SHIPPED | OUTPATIENT
Start: 2019-11-04 | End: 2021-12-27

## 2020-02-06 RX ORDER — METOCLOPRAMIDE 10 MG/1
10 TABLET ORAL 4 TIMES DAILY
Qty: 120 TABLET | Refills: 3 | Status: SHIPPED | OUTPATIENT
Start: 2020-02-06 | End: 2021-02-17

## 2020-02-06 RX ORDER — METOCLOPRAMIDE 10 MG/1
10 TABLET ORAL 4 TIMES DAILY
Qty: 120 TABLET | Refills: 3 | OUTPATIENT
Start: 2020-02-06

## 2020-02-06 NOTE — TELEPHONE ENCOUNTER
Pt says yes she is still taking it. After dr Mundo Reddy discontinued it 6 months ago she went to dr Jolie Burkett and he said to resume it. I asked pt if dr Kath Chapman was prescribing that for her and she said yes but she thought that dr Mundo Reddy would take over and fill that med.

## 2020-02-20 ENCOUNTER — OFFICE VISIT (OUTPATIENT)
Dept: FAMILY MEDICINE CLINIC | Age: 60
End: 2020-02-20
Payer: MEDICARE

## 2020-02-20 VITALS
SYSTOLIC BLOOD PRESSURE: 132 MMHG | WEIGHT: 197 LBS | HEART RATE: 64 BPM | DIASTOLIC BLOOD PRESSURE: 80 MMHG | RESPIRATION RATE: 12 BRPM | TEMPERATURE: 98.5 F | BODY MASS INDEX: 32.82 KG/M2 | HEIGHT: 65 IN

## 2020-02-20 PROCEDURE — 3017F COLORECTAL CA SCREEN DOC REV: CPT | Performed by: FAMILY MEDICINE

## 2020-02-20 PROCEDURE — G8484 FLU IMMUNIZE NO ADMIN: HCPCS | Performed by: FAMILY MEDICINE

## 2020-02-20 PROCEDURE — G0438 PPPS, INITIAL VISIT: HCPCS | Performed by: FAMILY MEDICINE

## 2020-02-20 ASSESSMENT — PATIENT HEALTH QUESTIONNAIRE - PHQ9
SUM OF ALL RESPONSES TO PHQ QUESTIONS 1-9: 0
SUM OF ALL RESPONSES TO PHQ QUESTIONS 1-9: 0

## 2020-02-20 ASSESSMENT — LIFESTYLE VARIABLES: HOW OFTEN DO YOU HAVE A DRINK CONTAINING ALCOHOL: 0

## 2020-02-20 NOTE — PROGRESS NOTES
Medicare Annual Wellness Visit  Name: Tamar Gómez Date: 2020   MRN: [id number] Sex: Female   Age: 60 y.o. Ethnicity: Non-/Non    : 1960 Race: Black      Montserrat Taylor is here for Medicare AWV and Medication Check (discuss when to take nitro)    Screenings for behavioral, psychosocial and functional/safety risks, and cognitive dysfunction are all negative except as indicated below. These results, as well as other patient data from the [address] [facility] [address] form, are documented in Flowsheets linked to this Encounter. Allergies   Allergen Reactions    Cephalexin     Estrogens Hives and Itching    Prednisone     Statins      Myalgias    Sulfa Antibiotics          Prior to Visit Medications    Medication Sig Taking? Authorizing Provider   metoclopramide (REGLAN) 10 MG tablet Take 1 tablet by mouth 4 times daily Yes Adriel Meza DO   rosuvastatin (CRESTOR) 40 MG tablet take 1 tablet by mouth every week Yes ASHLEY Weiner CNP   pantoprazole (PROTONIX) 40 MG tablet take 1 tablet by mouth twice a day before meals Yes ASHLEY Weiner CNP   pantoprazole (PROTONIX) 20 MG tablet Take 20 mg by mouth daily  Historical Provider, MD   nitroGLYCERIN (NITROSTAT) 0.4 MG SL tablet up to max of 3 total doses. If no relief after 1 dose, call 911.   Ruth Carvalho MD         Past Medical History:   Diagnosis Date    Brain cyst     GERD (gastroesophageal reflux disease)     Hyperlipidemia     Hypertension        Past Surgical History:   Procedure Laterality Date    COLONOSCOPY  2009    COLONOSCOPY Left 10/22/2019    COLONOSCOPY DIAGNOSTIC performed by Seema Harp MD at [address] Right 2017    EYE SURGERY      HYSTERECTOMY      UPPER GASTROINTESTINAL ENDOSCOPY  2016         Family History   Problem Relation Age of Onset    Heart Disease Mother     High Blood Pressure Mother     Diabetes Mother     High Blood Pressure Father     Breast Cancer Sister     Colon Cancer Neg Hx     Cancer Neg Hx        CareTeam (Including outside providers/suppliers regularly involved in providing care):   Patient Care Team:  Loly Ramirez DO as PCP - General (Family Medicine)  Loly Ramirez DO [facility] PCP [facility]    Wt Readings from Last 3 Encounters:   02/20/20 197 lb (89.4 kg)   10/22/19 195 lb (88.5 kg)   06/19/19 193 lb (87.5 kg)     Vitals:    02/20/20 1128   BP: 132/80   Pulse: 64   Resp: 12   Temp: 98.5 °F (36.9 °C)   TempSrc: Oral   Weight: 197 lb (89.4 kg)   Height: 5' 4.5\" (1.638 m)     Body mass index is 33.29 kg/m². Based upon direct observation of the patient, evaluation of cognition reveals recent and remote memory intact.     General Appearance: alert and oriented to person, place and time, well developed and well- nourished, in no acute distress  Skin: warm and dry, no rash or erythema  Head: normocephalic and atraumatic  Eyes: pupils equal, round, and reactive to light, extraocular eye movements intact, conjunctivae normal  ENT: tympanic membrane, external ear and ear canal normal bilaterally, nose without deformity, nasal mucosa and turbinates normal without polyps  Neck: supple and non-tender without mass, no thyromegaly or thyroid nodules, no cervical lymphadenopathy  Pulmonary/Chest: clear to auscultation bilaterally- no wheezes, rales or rhonchi, normal air movement, no respiratory distress  Cardiovascular: normal rate, regular rhythm, normal S1 and S2, no murmurs, rubs, clicks, or gallops, distal pulses intact, no carotid bruits  Abdomen: soft, non-tender, non-distended, normal bowel sounds, no masses or organomegaly  Extremities: no cyanosis, clubbing or edema  Musculoskeletal: normal range of motion, no joint swelling, deformity or tenderness  Neurologic: reflexes normal and symmetric, no cranial nerve deficit, gait, coordination and speech normal    Patient's complete Health Risk Assessment and screening values have been reviewed and are found in [facility]. The following problems were reviewed today and where indicated follow up appointments were made and/or referrals ordered. Positive Risk Factor Screenings with Interventions:     General Health:  General  In general, how would you say your health is?: Very Good  In the past 7 days, have you experienced any of the following? New or Increased Pain, New or Increased Fatigue, Loneliness, Social Isolation, Stress or Anger?: (!) Stress  Do you get the social and emotional support that you need?: Yes  Do you have a Living Will?: (!) No  General Health Risk Interventions:  · Stress: patient declines any further evaluation/treatment for this issue    Health Habits/Nutrition:  Health Habits/Nutrition  Do you exercise for at least 20 minutes 2-3 times per week?: (!) No  Have you lost any weight without trying in the past 3 months?: (!) Yes  Do you eat fewer than 2 meals per day?: (!) Yes  Have you seen a dentist within the past year?: Yes  Body mass index is 33.29 kg/m². Health Habits/Nutrition Interventions:  · Encouraged on proper nutrition    Safety:  Safety  Do you have working smoke detectors?: Yes  Have all throw rugs been removed or fastened?: (!) No  Do you have non-slip mats or surfaces in all bathtubs/showers?: Yes  Do all of your stairways have a railing or banister?: Yes  Are your doorways, halls and stairs free of clutter?: (!) No  Do you always fasten your seatbelt when you are in a car?: Yes  Safety Interventions:  · Home safety tips provided    Personalized Preventive Plan   Current Health Maintenance Status    There is no immunization history on file for this patient.      Health Maintenance   Topic Date Due    Hepatitis C screen  1960    DTaP/Tdap/Td vaccine (1 - Tdap) 06/18/1970    HIV screen  06/18/1974    Shingles Vaccine (1 of 2) 06/18/2009    A1C test (Diabetic or Prediabetic)  08/21/2016    Annual Wellness Visit (AWV)  06/19/2019    Potassium monitoring 08/17/2019    Creatinine monitoring  08/17/2019    Flu vaccine (1) 09/01/2019    Lipid screen  11/28/2019    Breast cancer screen  06/06/2020    Cervical cancer screen  12/21/2020    Colon cancer screen colonoscopy  10/22/2029    Hepatitis A vaccine  Aged Out    Hepatitis B vaccine  Aged Out    Hib vaccine  Aged Out    Meningococcal (ACWY) vaccine  Aged Out    Pneumococcal 0-64 years Vaccine  Aged Out     Recommendations for [employer] Due: see orders and patient instructions/AVS.  . Recommended screening schedule for the next 5-10 years is provided to the patient in written form: see Patient Vijay Resides was seen today for medicare awv and medication check. 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

## 2020-02-20 NOTE — PATIENT INSTRUCTIONS
Personalized Preventive Plan for Luann Beasley - 2/20/2020  Medicare offers a range of preventive health benefits. Some of the tests and screenings are paid in full while other may be subject to a deductible, co-insurance, and/or copay. Some of these benefits include a comprehensive review of your medical history including lifestyle, illnesses that may run in your family, and various assessments and screenings as appropriate. After reviewing your medical record and screening and assessments performed today your provider may have ordered immunizations, labs, imaging, and/or referrals for you. A list of these orders (if applicable) as well as your Preventive Care list are included within your After Visit Summary for your review. Other Preventive Recommendations:    · A preventive eye exam performed by an eye specialist is recommended every 1-2 years to screen for glaucoma; cataracts, macular degeneration, and other eye disorders. · A preventive dental visit is recommended every 6 months. · Try to get at least 150 minutes of exercise per week or 10,000 steps per day on a pedometer . · Order or download the FREE \"Exercise & Physical Activity: Your Everyday Guide\" from [employer]. Call 9-227.266.5477 or search [employer] online. · You need 1575-0770 mg of calcium and 8130-5504 IU of vitamin D per day. It is possible to meet your calcium requirement with diet alone, but a vitamin D supplement is usually necessary to meet this goal.  · When exposed to the sun, use a sunscreen that protects against both UVA and UVB radiation with an SPF of 30 or greater. Reapply every 2 to 3 hours or after sweating, drying off with a towel, or swimming. · Always wear a seat belt when traveling in a car. Always wear a helmet when riding a bicycle or motorcycle.

## 2020-03-11 RX ORDER — PANTOPRAZOLE SODIUM 40 MG/1
40 TABLET, DELAYED RELEASE ORAL DAILY
Qty: 90 TABLET | Refills: 3 | Status: SHIPPED | OUTPATIENT
Start: 2020-03-11 | End: 2020-07-01

## 2020-03-16 ENCOUNTER — TELEPHONE (OUTPATIENT)
Dept: FAMILY MEDICINE CLINIC | Age: 60
End: 2020-03-16

## 2020-06-16 ENCOUNTER — TELEPHONE (OUTPATIENT)
Dept: FAMILY MEDICINE CLINIC | Age: 60
End: 2020-06-16

## 2020-07-01 RX ORDER — PANTOPRAZOLE SODIUM 40 MG/1
TABLET, DELAYED RELEASE ORAL
Qty: 180 TABLET | OUTPATIENT
Start: 2020-07-01

## 2020-08-11 ENCOUNTER — TELEPHONE (OUTPATIENT)
Dept: FAMILY MEDICINE CLINIC | Age: 60
End: 2020-08-11

## 2020-08-11 NOTE — TELEPHONE ENCOUNTER
2nd attempt to contact the pt re:overdue labs Dr Vasquez Andujar ordered on 2/20/20. HIPAA form is up to date, orders mailed.

## 2020-10-08 ENCOUNTER — TELEPHONE (OUTPATIENT)
Dept: FAMILY MEDICINE CLINIC | Age: 60
End: 2020-10-08

## 2020-10-08 NOTE — TELEPHONE ENCOUNTER
Patient called to request an appointment to follow up with Dr. Mahi Osei about dizziness that she said has increased since she spoke with him about it at her last visit.   DOLV 02/20/2020  First available appointment is 10/15/2020  Please advise if able to be seen sooner  [phone]

## 2020-10-10 ENCOUNTER — OFFICE VISIT (OUTPATIENT)
Dept: FAMILY MEDICINE CLINIC | Age: 60
End: 2020-10-10
Payer: MEDICARE

## 2020-10-10 VITALS
TEMPERATURE: 98.8 F | BODY MASS INDEX: 33.52 KG/M2 | WEIGHT: 201.2 LBS | RESPIRATION RATE: 18 BRPM | HEART RATE: 59 BPM | DIASTOLIC BLOOD PRESSURE: 82 MMHG | OXYGEN SATURATION: 96 % | SYSTOLIC BLOOD PRESSURE: 130 MMHG | HEIGHT: 65 IN

## 2020-10-10 PROBLEM — I47.10 SVT (SUPRAVENTRICULAR TACHYCARDIA): Status: ACTIVE | Noted: 2020-10-10

## 2020-10-10 PROBLEM — I47.1 SVT (SUPRAVENTRICULAR TACHYCARDIA) (HCC): Status: ACTIVE | Noted: 2020-10-10

## 2020-10-10 PROCEDURE — 99214 OFFICE O/P EST MOD 30 MIN: CPT | Performed by: FAMILY MEDICINE

## 2020-10-10 PROCEDURE — G8427 DOCREV CUR MEDS BY ELIG CLIN: HCPCS | Performed by: FAMILY MEDICINE

## 2020-10-10 PROCEDURE — G8484 FLU IMMUNIZE NO ADMIN: HCPCS | Performed by: FAMILY MEDICINE

## 2020-10-10 PROCEDURE — 3017F COLORECTAL CA SCREEN DOC REV: CPT | Performed by: FAMILY MEDICINE

## 2020-10-10 PROCEDURE — 93000 ELECTROCARDIOGRAM COMPLETE: CPT | Performed by: FAMILY MEDICINE

## 2020-10-10 PROCEDURE — G8417 CALC BMI ABV UP PARAM F/U: HCPCS | Performed by: FAMILY MEDICINE

## 2020-10-10 PROCEDURE — 1036F TOBACCO NON-USER: CPT | Performed by: FAMILY MEDICINE

## 2020-10-10 RX ORDER — MECLIZINE HYDROCHLORIDE 25 MG/1
25 TABLET ORAL 3 TIMES DAILY PRN
Qty: 30 TABLET | Refills: 0 | Status: SHIPPED | OUTPATIENT
Start: 2020-10-10 | End: 2020-10-20

## 2020-10-10 NOTE — PROGRESS NOTES
0-64 years Vaccine  Aged Out       Past Medical History:   Diagnosis Date    Brain cyst     GERD (gastroesophageal reflux disease)     Hyperlipidemia     Hypertension        Past Surgical History:   Procedure Laterality Date    COLONOSCOPY  2009    COLONOSCOPY Left 10/22/2019    COLONOSCOPY DIAGNOSTIC performed by Merrill Astorga MD at [address] Right 2017    EYE SURGERY      HYSTERECTOMY      UPPER GASTROINTESTINAL ENDOSCOPY  2016       Social History     Tobacco Use    Smoking status: Former Smoker     Packs/day: 1.00     Years: 10.00     Pack years: 10.00     Last attempt to quit: 1990     Years since quittin.7    Smokeless tobacco: Never Used   Substance Use Topics    Alcohol use: No    Drug use: No       Family History   Problem Relation Age of Onset    Heart Disease Mother     High Blood Pressure Mother     Diabetes Mother     High Blood Pressure Father     Breast Cancer Sister     Colon Cancer Neg Hx     Cancer Neg Hx          I have reviewed the patient's past medical history, past surgical history, allergies, medications, social and family history and I have made updates where appropriate.     Review of Systems        PHYSICAL EXAM:  /82   Pulse 59   Temp 98.8 °F (37.1 °C)   Resp 18   Ht 5' 4.5\" (1.638 m)   Wt 201 lb 3.2 oz (91.3 kg)   SpO2 96%   BMI 34.00 kg/m²     General Appearance: well developed and well- nourished, in no acute distress  Head: normocephalic and atraumatic  ENT: external ear and ear canal normal bilaterally, nose without deformity, nasal mucosa and turbinates normal without polyps, oropharynx normal, dentition is normal for age, no lipor gum lesions noted  Neck: supple and non-tender without mass, no thyromegaly or thyroid nodules, no cervical lymphadenopathy  Pulmonary/Chest: clear to auscultation bilaterally- no wheezes, rales or rhonchi, normal air movement, no respiratory distress or retractions  Cardiovascular: normal rate, regular rhythm, normal S1 and S2, no murmurs, rubs, clicks, or gallops, distal pulses intact  Extremities: no cyanosis, clubbing or edema of the lower extremities  Psych:  Normal affect without evidence of depression oranxiety, insight and judgement are appropriate, memory appears intact  Skin: warm and dry, no rash or erythema  Negative Herrera Speak bilaterally    ASSESSMENT & PLAN  Nilsa Rivera was seen today for dizziness. 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 Will obtain labs, VNG and audiometry. If wnl, will obtain holter monitor.  -Start meclizine for now for   -Patient advised to call immediately or go to ER if any worsening of symptoms      Return if symptoms worsen or fail to improve. Controlled Substance Monitoring:    Acute and Chronic Pain Monitoring:   No flowsheet data found. Montserrat received counseling on the following healthy behaviors: medication adherence  Reviewed prior labs and health maintenance.   Continue current medications, diet and exercise. Discussed use, benefit, and side effects of prescribed medications. Barriers to medication compliance addressed. Patient given educational materials - see patient instructions. All patient questions answered. Patient voiced understanding.

## 2020-10-12 ENCOUNTER — TELEPHONE (OUTPATIENT)
Dept: FAMILY MEDICINE CLINIC | Age: 60
End: 2020-10-12

## 2020-10-12 NOTE — TELEPHONE ENCOUNTER
MRI BRAIN WITH / WITHOUT 10/21/20@ [address] @ 9:00  Arrive @ 8:30 to 1st floor radiology   Wear mask     **  Pt must finish labs that were order , at least 3 to 4 days prior to procedure  **

## 2020-10-16 ENCOUNTER — HOSPITAL ENCOUNTER (OUTPATIENT)
Age: 60
Discharge: HOME | End: 2020-10-16
Payer: MEDICARE

## 2020-10-16 LAB
ANION GAP SERPL CALCULATED.3IONS-SCNC: 11 MEQ/L (ref 8–16)
BASOPHILS # BLD: 0.8 %
BASOPHILS ABSOLUTE: 0 THOU/MM3 (ref 0–0.1)
BUN BLDV-MCNC: 6 MG/DL (ref 7–22)
CALCIUM SERPL-MCNC: 9.6 MG/DL (ref 8.5–10.5)
CHLORIDE BLD-SCNC: 103 MEQ/L (ref 98–111)
CHOLESTEROL, TOTAL: 240 MG/DL (ref 100–199)
CO2: 26 MEQ/L (ref 23–33)
CREAT SERPL-MCNC: 0.9 MG/DL (ref 0.4–1.2)
EOSINOPHIL # BLD: 2.3 %
EOSINOPHILS ABSOLUTE: 0.1 THOU/MM3 (ref 0–0.4)
ERYTHROCYTE [DISTWIDTH] IN BLOOD BY AUTOMATED COUNT: 14 % (ref 11.5–14.5)
ERYTHROCYTE [DISTWIDTH] IN BLOOD BY AUTOMATED COUNT: 45.4 FL (ref 35–45)
GFR SERPL CREATININE-BSD FRML MDRD: 77 ML/MIN/1.73M2
GLUCOSE BLD-MCNC: 118 MG/DL (ref 70–108)
HCT VFR BLD CALC: 40.5 % (ref 37–47)
HDLC SERPL-MCNC: 50 MG/DL
HEMOGLOBIN: 12.9 GM/DL (ref 12–16)
IMMATURE GRANS (ABS): 0 THOU/MM3 (ref 0–0.07)
IMMATURE GRANULOCYTES: 0 %
LDL CHOLESTEROL CALCULATED: 153 MG/DL
LYMPHOCYTES # BLD: 57.6 %
LYMPHOCYTES ABSOLUTE: 2.9 THOU/MM3 (ref 1–4.8)
MCH RBC QN AUTO: 28.5 PG (ref 26–33)
MCHC RBC AUTO-ENTMCNC: 31.9 GM/DL (ref 32.2–35.5)
MCV RBC AUTO: 89.6 FL (ref 81–99)
MONOCYTES # BLD: 5.3 %
MONOCYTES ABSOLUTE: 0.3 THOU/MM3 (ref 0.4–1.3)
NUCLEATED RED BLOOD CELLS: 0 /100 WBC
PLATELET # BLD: 246 THOU/MM3 (ref 130–400)
PMV BLD AUTO: 10.9 FL (ref 9.4–12.4)
POTASSIUM SERPL-SCNC: 3.9 MEQ/L (ref 3.5–5.2)
RBC # BLD: 4.52 MILL/MM3 (ref 4.2–5.4)
SEG NEUTROPHILS: 34 %
SEGMENTED NEUTROPHILS ABSOLUTE COUNT: 1.7 THOU/MM3 (ref 1.8–7.7)
SODIUM BLD-SCNC: 140 MEQ/L (ref 135–145)
TRIGL SERPL-MCNC: 186 MG/DL (ref 0–199)
TSH SERPL DL<=0.05 MIU/L-ACNC: 1.82 UIU/ML (ref 0.4–4.2)
WBC # BLD: 5.1 THOU/MM3 (ref 4.8–10.8)

## 2020-10-16 PROCEDURE — 36415 COLL VENOUS BLD VENIPUNCTURE: CPT

## 2020-10-16 PROCEDURE — 85025 COMPLETE CBC W/AUTO DIFF WBC: CPT

## 2020-10-16 PROCEDURE — 80061 LIPID PANEL: CPT

## 2020-10-16 PROCEDURE — 84443 ASSAY THYROID STIM HORMONE: CPT

## 2020-10-16 PROCEDURE — 80048 BASIC METABOLIC PNL TOTAL CA: CPT

## 2021-01-07 ENCOUNTER — TELEPHONE (OUTPATIENT)
Dept: FAMILY MEDICINE CLINIC | Age: 61
End: 2021-01-07

## 2021-01-08 NOTE — TELEPHONE ENCOUNTER
Spoke with pt she stated understanding of lab results and only wanted to know her cholesterol results and wanted to know if her medications are working or not pt stated she no showed her MRI bc of the pandemic and no showed at audialogy appt bc she was sick

## 2021-01-28 ENCOUNTER — HOSPITAL ENCOUNTER (OUTPATIENT)
Dept: MRI IMAGING | Age: 61
Discharge: HOME | End: 2021-01-28
Payer: MEDICARE

## 2021-01-28 DIAGNOSIS — Q01.9 CEPHALOCELE (HCC): ICD-10-CM

## 2021-01-28 LAB — POC CREATININE WHOLE BLOOD: 1.3 MG/DL (ref 0.5–1.2)

## 2021-01-28 PROCEDURE — 70553 MRI BRAIN STEM W/O & W/DYE: CPT

## 2021-01-28 PROCEDURE — A9579 GAD-BASE MR CONTRAST NOS,1ML: HCPCS | Performed by: FAMILY MEDICINE

## 2021-01-28 PROCEDURE — 82565 ASSAY OF CREATININE: CPT

## 2021-01-28 PROCEDURE — 6360000004 HC RX CONTRAST MEDICATION: Performed by: FAMILY MEDICINE

## 2021-01-28 RX ADMIN — GADOTERIDOL 20 ML: 279.3 INJECTION, SOLUTION INTRAVENOUS at 15:32

## 2021-01-29 ENCOUNTER — OFFICE VISIT (OUTPATIENT)
Dept: FAMILY MEDICINE CLINIC | Age: 61
End: 2021-01-29
Payer: MEDICARE

## 2021-01-29 VITALS
OXYGEN SATURATION: 98 % | BODY MASS INDEX: 33.36 KG/M2 | DIASTOLIC BLOOD PRESSURE: 86 MMHG | RESPIRATION RATE: 12 BRPM | HEIGHT: 65 IN | TEMPERATURE: 98.5 F | WEIGHT: 200.2 LBS | SYSTOLIC BLOOD PRESSURE: 136 MMHG | HEART RATE: 66 BPM

## 2021-01-29 DIAGNOSIS — K29.00 ACUTE GASTRITIS WITHOUT HEMORRHAGE, UNSPECIFIED GASTRITIS TYPE: ICD-10-CM

## 2021-01-29 DIAGNOSIS — Q01.9 CEPHALOCELE (HCC): ICD-10-CM

## 2021-01-29 DIAGNOSIS — I49.9 IRREGULAR HEART BEAT: Primary | ICD-10-CM

## 2021-01-29 DIAGNOSIS — R07.89 ATYPICAL CHEST PAIN: ICD-10-CM

## 2021-01-29 DIAGNOSIS — I47.1 SVT (SUPRAVENTRICULAR TACHYCARDIA) (HCC): ICD-10-CM

## 2021-01-29 PROCEDURE — 1036F TOBACCO NON-USER: CPT | Performed by: FAMILY MEDICINE

## 2021-01-29 PROCEDURE — G8427 DOCREV CUR MEDS BY ELIG CLIN: HCPCS | Performed by: FAMILY MEDICINE

## 2021-01-29 PROCEDURE — 93000 ELECTROCARDIOGRAM COMPLETE: CPT | Performed by: FAMILY MEDICINE

## 2021-01-29 PROCEDURE — G8417 CALC BMI ABV UP PARAM F/U: HCPCS | Performed by: FAMILY MEDICINE

## 2021-01-29 PROCEDURE — 3017F COLORECTAL CA SCREEN DOC REV: CPT | Performed by: FAMILY MEDICINE

## 2021-01-29 PROCEDURE — 99214 OFFICE O/P EST MOD 30 MIN: CPT | Performed by: FAMILY MEDICINE

## 2021-01-29 PROCEDURE — G8484 FLU IMMUNIZE NO ADMIN: HCPCS | Performed by: FAMILY MEDICINE

## 2021-01-29 RX ORDER — SUCRALFATE 1 G/1
TABLET ORAL
Qty: 360 TABLET | OUTPATIENT
Start: 2021-01-29

## 2021-01-29 RX ORDER — SUCRALFATE 1 G/1
1 TABLET ORAL 4 TIMES DAILY
Qty: 120 TABLET | Refills: 3 | Status: SHIPPED | OUTPATIENT
Start: 2021-01-29

## 2021-01-29 SDOH — ECONOMIC STABILITY: INCOME INSECURITY: HOW HARD IS IT FOR YOU TO PAY FOR THE VERY BASICS LIKE FOOD, HOUSING, MEDICAL CARE, AND HEATING?: NOT HARD AT ALL

## 2021-01-29 SDOH — ECONOMIC STABILITY: FOOD INSECURITY: WITHIN THE PAST 12 MONTHS, THE FOOD YOU BOUGHT JUST DIDN'T LAST AND YOU DIDN'T HAVE MONEY TO GET MORE.: NEVER TRUE

## 2021-01-29 SDOH — ECONOMIC STABILITY: TRANSPORTATION INSECURITY
IN THE PAST 12 MONTHS, HAS THE LACK OF TRANSPORTATION KEPT YOU FROM MEDICAL APPOINTMENTS OR FROM GETTING MEDICATIONS?: NO

## 2021-01-29 SDOH — ECONOMIC STABILITY: TRANSPORTATION INSECURITY
IN THE PAST 12 MONTHS, HAS LACK OF TRANSPORTATION KEPT YOU FROM MEETINGS, WORK, OR FROM GETTING THINGS NEEDED FOR DAILY LIVING?: NO

## 2021-01-29 ASSESSMENT — PATIENT HEALTH QUESTIONNAIRE - PHQ9
SUM OF ALL RESPONSES TO PHQ QUESTIONS 1-9: 0
1. LITTLE INTEREST OR PLEASURE IN DOING THINGS: 0
SUM OF ALL RESPONSES TO PHQ QUESTIONS 1-9: 0
2. FEELING DOWN, DEPRESSED OR HOPELESS: 0

## 2021-01-29 NOTE — PROGRESS NOTES
Carol Dong is a 63 y.o. female that presents for     Chief Complaint   Patient presents with    Palpitations     Pt presents c/o palpatations that started 3 months ago. She states sfter she eats she feels like something bubbles up and then she gets severe pain in her chest that shoots into her back. She states she has had vertigo. She denies SOB, HA, vision changes, or pain that shoots into her arms or jaw. /86   Pulse 66   Temp 98.5 °F (36.9 °C)   Resp 12   Ht 5' 4.5\" (1.638 m)   Wt 200 lb 3.2 oz (90.8 kg)   SpO2 98%   BMI 33.83 kg/m²       HPI:    Chest Pain  States that these symptoms have been present for 'a long time'. States that when she eats, she will get a 'bubbling' sensation in her chest, will start to feel like she is getting palpitations and then resolve. Tylenol or NSAIDs will improve the pain. Location -  left chest  Symptoms have been present for at least 2 years, has been getting worse. She did have a cardiac cath in 2017 that displayed minimal CAD. Has followed with GI in the past.    Onset of symptoms was gradual.  Inciting Event? No       Description of chest pain -pressure . The pain radiates to the back. Intensity - can be moderate to severe. Aggravating factors - eating, laying flat after eating. Alleviating factors -  Tylenol, ASA will help     N/V? Yes - intermittently  SOB? No  Lightheadedness? No  Palpitations? Yes - 'feels like it speeds up'  Diaphoresis? No  Cough?   No            Health Maintenance   Topic Date Due    HIV screen  06/18/1974    DTaP/Tdap/Td vaccine (1 - Tdap) 06/18/1978    A1C test (Diabetic or Prediabetic)  11/21/2015    Breast cancer screen  06/06/2020    Cervical cancer screen  12/21/2020    Annual Wellness Visit (AWV)  02/20/2021    Flu vaccine (1) 10/10/2021 (Originally 9/1/2020)    Shingles Vaccine (1 of 2) 10/10/2021 (Originally 6/18/2009)    Hepatitis C screen  10/10/2021 (Originally 1960)  Lipid screen  10/16/2021    Potassium monitoring  10/16/2021    Creatinine monitoring  10/16/2021    Colon cancer screen colonoscopy  10/22/2029    Hepatitis A vaccine  Aged Out    Hepatitis B vaccine  Aged Out    Hib vaccine  Aged Out    Meningococcal (ACWY) vaccine  Aged Out    Pneumococcal 0-64 years Vaccine  Aged Out       Past Medical History:   Diagnosis Date    Brain cyst     GERD (gastroesophageal reflux disease)     Hyperlipidemia     Hypertension        Past Surgical History:   Procedure Laterality Date    COLONOSCOPY  2009    COLONOSCOPY Left 10/22/2019    COLONOSCOPY DIAGNOSTIC performed by Tamiko Somers MD at [address] [place] [address] Drive Right 2017    EYE SURGERY      HYSTERECTOMY      UPPER GASTROINTESTINAL ENDOSCOPY  2016       Social History     Tobacco Use    Smoking status: Former Smoker     Packs/day: 1.00     Years: 10.00     Pack years: 10.00     Quit date: 1990     Years since quittin.0    Smokeless tobacco: Never Used   Substance Use Topics    Alcohol use: No    Drug use: No       Family History   Problem Relation Age of Onset    Heart Disease Mother     High Blood Pressure Mother     Diabetes Mother     High Blood Pressure Father     Breast Cancer Sister     Colon Cancer Neg Hx     Cancer Neg Hx          I have reviewed the patient's past medical history, past surgical history, allergies, medications, social and family history and I have made updates where appropriate.     Review of Systems        PHYSICAL EXAM:  /86   Pulse 66   Temp 98.5 °F (36.9 °C)   Resp 12   Ht 5' 4.5\" (1.638 m)   Wt 200 lb 3.2 oz (90.8 kg)   SpO2 98%   BMI 33.83 kg/m²     General Appearance: well developed and well- nourished, in no acute distress  Head: normocephalic and atraumatic ENT: external ear and ear canal normal bilaterally, nose without deformity, nasal mucosa and turbinates normal without polyps, oropharynx normal, dentition is normal for age, no lipor gum lesions noted  Neck: supple and non-tender without mass, no thyromegaly or thyroid nodules, no cervical lymphadenopathy  Pulmonary/Chest: clear to auscultation bilaterally- no wheezes, rales or rhonchi, normal air movement, no respiratory distress or retractions  Cardiovascular: normal rate, regular rhythm, normal S1 and S2, no murmurs, rubs, clicks, or gallops, distal pulses intact  Abdomen: soft, non-tender, non-distended, no rebound or guarding, no masses or hernias noted, no hepatospleenomegaly  Extremities: no cyanosis, clubbing or edema of the lower extremities  Psych:  Normal affect without evidence of depression oranxiety, insight and judgement are appropriate, memory appears intact  Skin: warm and dry, no rash or erythema    EKG - Rhythm - sinsu, Rate - 60s, intervals wnl, no acute ST segment changes, consistent with previous EKGs      ASSESSMENT & PLAN  Ismael Porras was seen today for palpitations. Diagnoses and all orders for this visit:    Irregular heart beat  -     EKG 12 Lead    Cephalocele (HCC)    SVT (supraventricular tachycardia) (HCC)    Atypical chest pain  -     US GALLBLADDER RUQ; Future    Acute gastritis without hemorrhage, unspecified gastritis type  -     sucralfate (CARAFATE) 1 GM tablet; Take 1 tablet by mouth 4 times daily  -     ROWAN - Kalli Larkin MD, Gastroenterology, [facility] JOANIE YEE II.VIERTEL    -Sx are most consistent with a GI pathology and it only really occurs with eating. She does have a hx of gastritis and gatroparesis. She has not been taking the reglan recently. This could also be GB in etiology. Will obtain RUQ US, refer to GI. Start carafate for possible gastritis or even PUD. Will let me know if any worsening of sx. Return if symptoms worsen or fail to improve.

## 2021-02-01 ENCOUNTER — TELEPHONE (OUTPATIENT)
Dept: FAMILY MEDICINE CLINIC | Age: 61
End: 2021-02-01

## 2021-02-01 NOTE — TELEPHONE ENCOUNTER
Scheduling US gallbladder 02/05/21 @ 9:00 @ [facility]   Arrive @ 8:30 to 1st floor radiology     NPO 8 hrs   Fat free supper the night before   Wear mask to hospital

## 2021-02-15 ENCOUNTER — HOSPITAL ENCOUNTER (OUTPATIENT)
Dept: ULTRASOUND IMAGING | Age: 61
Discharge: HOME | End: 2021-02-15
Payer: MEDICARE

## 2021-02-15 DIAGNOSIS — R07.89 ATYPICAL CHEST PAIN: ICD-10-CM

## 2021-02-15 PROCEDURE — 76705 ECHO EXAM OF ABDOMEN: CPT

## 2021-02-16 ENCOUNTER — TELEPHONE (OUTPATIENT)
Dept: FAMILY MEDICINE CLINIC | Age: 61
End: 2021-02-16

## 2021-02-16 NOTE — TELEPHONE ENCOUNTER
----- Message from ASHLEY Sun CNP sent at 2/16/2021 10:06 AM EST -----  She saw Dr. Evelyn Jin last week  Scheduled to undergo EGD and further testing with him  Please fax him a copy of the report  Please let Jessy Novak know that her US did not reveal and gallbladder distention or gallstones. I recommend she continue to follow with Dr. Evelyn Jin for further work-up and testing.    Electronically signed by ASHLEY Sun CNP on 2/16/2021 at 10:06 AM

## 2021-02-17 DIAGNOSIS — K31.84 GASTROPARESIS: ICD-10-CM

## 2021-02-17 RX ORDER — METOCLOPRAMIDE 10 MG/1
TABLET ORAL
Qty: 120 TABLET | Refills: 3 | Status: SHIPPED | OUTPATIENT
Start: 2021-02-17 | End: 2022-06-08 | Stop reason: SDUPTHER

## 2021-02-17 NOTE — TELEPHONE ENCOUNTER
Recent Visits  Date Type Provider Dept   01/29/21 Office Visit Patrice Santiago, [facility]   10/10/20 Office Visit [facility]   02/20/20 Office Visit Patrice Santiago DO [facility]   Showing recent visits within past 540 days with a meds authorizing provider and meeting all other requirements     Future Appointments  No visits were found meeting these conditions. Showing future appointments within next 150 days with a meds authorizing provider and meeting all other requirements       No future appointments.

## 2021-03-30 ENCOUNTER — OFFICE VISIT (OUTPATIENT)
Dept: FAMILY MEDICINE CLINIC | Age: 61
End: 2021-03-30

## 2021-03-30 VITALS
HEIGHT: 65 IN | TEMPERATURE: 98.7 F | DIASTOLIC BLOOD PRESSURE: 80 MMHG | WEIGHT: 204 LBS | SYSTOLIC BLOOD PRESSURE: 122 MMHG | OXYGEN SATURATION: 98 % | BODY MASS INDEX: 33.99 KG/M2 | HEART RATE: 68 BPM | RESPIRATION RATE: 18 BRPM

## 2021-03-30 DIAGNOSIS — R07.89 ATYPICAL CHEST PAIN: Primary | ICD-10-CM

## 2021-03-30 DIAGNOSIS — K31.84 GASTROPARESIS: ICD-10-CM

## 2021-03-30 PROCEDURE — 1036F TOBACCO NON-USER: CPT | Performed by: FAMILY MEDICINE

## 2021-03-30 PROCEDURE — G8427 DOCREV CUR MEDS BY ELIG CLIN: HCPCS | Performed by: FAMILY MEDICINE

## 2021-03-30 PROCEDURE — G8417 CALC BMI ABV UP PARAM F/U: HCPCS | Performed by: FAMILY MEDICINE

## 2021-03-30 PROCEDURE — G8484 FLU IMMUNIZE NO ADMIN: HCPCS | Performed by: FAMILY MEDICINE

## 2021-03-30 PROCEDURE — 99213 OFFICE O/P EST LOW 20 MIN: CPT | Performed by: FAMILY MEDICINE

## 2021-03-30 PROCEDURE — 3017F COLORECTAL CA SCREEN DOC REV: CPT | Performed by: FAMILY MEDICINE

## 2021-03-30 NOTE — PROGRESS NOTES
Karthik Martinez is a 63 y.o. female that presents for     Chief Complaint   Patient presents with    Follow-up     chest pain , naseau , dizziness is gone ,     Other     anemia :  shaky and weak if hasne eaten in a while       /80   Pulse 68   Temp 98.7 °F (37.1 °C)   Resp 18   Ht 5' 4.5\" (1.638 m)   Wt 204 lb (92.5 kg)   SpO2 98%   BMI 34.48 kg/m²       HPI:    Reports that she is continuing to have chest pain. Has been present for about 15 years, but feels like it is getting worse. States that it has been intermittent, last event was yesterday. Prior to this, last episode was 1 week ago. Pain 'hurts real bad' when it occurs and is on the left side of the chest.  Will get palpitations with this. Will last 15-20 minutes. Can occur rest or with activity. We had worked this up previously from a GI standpoint and that was unremarkable. Notes that she has had some episodes in which she weak and shaky when she doesn't eat for awhile. Improved with eating.       Health Maintenance   Topic Date Due    HIV screen  Never done    COVID-19 Vaccine (1) Never done    DTaP/Tdap/Td vaccine (1 - Tdap) Never done    A1C test (Diabetic or Prediabetic)  11/21/2015    Annual Wellness Visit (AWV)  Never done    Breast cancer screen  06/06/2020    Cervical cancer screen  12/21/2020    Flu vaccine (1) 10/10/2021 (Originally 9/1/2020)    Shingles Vaccine (1 of 2) 10/10/2021 (Originally 6/18/2009)    Hepatitis C screen  10/10/2021 (Originally 1960)    Lipid screen  10/16/2021    Potassium monitoring  10/16/2021    Creatinine monitoring  10/16/2021    Colon cancer screen colonoscopy  10/22/2029    Hepatitis A vaccine  Aged Out    Hepatitis B vaccine  Aged Out    Hib vaccine  Aged Out    Meningococcal (ACWY) vaccine  Aged Out    Pneumococcal 0-64 years Vaccine  Aged Out       Past Medical History:   Diagnosis Date    Brain cyst     GERD (gastroesophageal reflux disease)     Hyperlipidemia  Hypertension        Past Surgical History:   Procedure Laterality Date    COLONOSCOPY  2009    COLONOSCOPY Left 10/22/2019    COLONOSCOPY DIAGNOSTIC performed by Rashaun Arrieta MD at [facility] [address] Right 2017    EYE SURGERY      HYSTERECTOMY      UPPER GASTROINTESTINAL ENDOSCOPY  2016       Social History     Tobacco Use    Smoking status: Former Smoker     Packs/day: 1.00     Years: 10.00     Pack years: 10.00     Quit date: 1990     Years since quittin.2    Smokeless tobacco: Never Used   Substance Use Topics    Alcohol use: No    Drug use: No       Family History   Problem Relation Age of Onset    Heart Disease Mother     High Blood Pressure Mother     Diabetes Mother     High Blood Pressure Father     Breast Cancer Sister     Colon Cancer Neg Hx     Cancer Neg Hx          I have reviewed the patient's past medical history, past surgical history, allergies, medications, social and family history and I have made updates where appropriate.     Review of Systems        PHYSICAL EXAM:  /80   Pulse 68   Temp 98.7 °F (37.1 °C)   Resp 18   Ht 5' 4.5\" (1.638 m)   Wt 204 lb (92.5 kg)   SpO2 98%   BMI 34.48 kg/m²     General Appearance: well developed and well- nourished, in no acute distress  Head: normocephalic and atraumatic  ENT: external ear and ear canal normal bilaterally, nose without deformity, nasal mucosa and turbinates normal without polyps, oropharynx normal, dentition is normal for age, no lipor gum lesions noted  Neck: supple and non-tender without mass, no thyromegaly or thyroid nodules, no cervical lymphadenopathy  Pulmonary/Chest: clear to auscultation bilaterally- no wheezes, rales or rhonchi, normal air movement, no respiratory distress or retractions  Cardiovascular: normal rate, regular rhythm, normal S1 and S2, no murmurs, rubs, clicks, or gallops, distal pulses intact  Extremities: no cyanosis, clubbing or edema of the lower extremities  Psych:

## 2021-04-05 ENCOUNTER — TELEPHONE (OUTPATIENT)
Dept: FAMILY MEDICINE CLINIC | Age: 61
End: 2021-04-05

## 2021-04-05 NOTE — TELEPHONE ENCOUNTER
VM not set up calling pt to inform of scheduled stress test for 04/19/21 at 12 pm at [facility]

## 2021-04-19 ENCOUNTER — HOSPITAL ENCOUNTER (OUTPATIENT)
Dept: NON INVASIVE DIAGNOSTICS | Age: 61
Discharge: HOME | End: 2021-04-19
Payer: MEDICARE

## 2021-04-19 VITALS — BODY MASS INDEX: 36.44 KG/M2 | HEIGHT: 62 IN | WEIGHT: 198 LBS

## 2021-04-19 DIAGNOSIS — R07.89 ATYPICAL CHEST PAIN: ICD-10-CM

## 2021-04-19 PROCEDURE — 78452 HT MUSCLE IMAGE SPECT MULT: CPT

## 2021-04-19 PROCEDURE — 93017 CV STRESS TEST TRACING ONLY: CPT | Performed by: INTERNAL MEDICINE

## 2021-04-19 PROCEDURE — A9500 TC99M SESTAMIBI: HCPCS | Performed by: INTERNAL MEDICINE

## 2021-04-19 PROCEDURE — 3430000000 HC RX DIAGNOSTIC RADIOPHARMACEUTICAL: Performed by: INTERNAL MEDICINE

## 2021-04-19 PROCEDURE — 6360000002 HC RX W HCPCS

## 2021-04-19 RX ADMIN — Medication 9.4 MILLICURIE: at 13:00

## 2021-04-19 RX ADMIN — Medication 29.5 MILLICURIE: at 13:50

## 2021-04-20 ENCOUNTER — TELEPHONE (OUTPATIENT)
Dept: FAMILY MEDICINE CLINIC | Age: 61
End: 2021-04-20

## 2021-04-20 DIAGNOSIS — R94.39 ABNORMAL STRESS TEST: ICD-10-CM

## 2021-04-20 DIAGNOSIS — R07.89 ATYPICAL CHEST PAIN: Primary | ICD-10-CM

## 2021-04-20 RX ORDER — ASPIRIN 81 MG/1
81 TABLET ORAL DAILY
Qty: 90 TABLET | Refills: 1 | Status: SHIPPED | OUTPATIENT
Start: 2021-04-20

## 2021-04-20 NOTE — TELEPHONE ENCOUNTER
----- Message from Karen Jefferson DO sent at 4/20/2021  9:17 AM EDT -----  Please call and let patient know that her stress test did display some changes that could indicate some decreased blood flow to her heart. This could potentially be the cause of her chest pain. I would like for her to take a daily baby Aspirin which I will send to the pharmacy and also will refer her to the cardiologist to follow up on these findings. Please let me know if she has any questions.

## 2021-04-22 ENCOUNTER — OFFICE VISIT (OUTPATIENT)
Dept: CARDIOLOGY CLINIC | Age: 61
End: 2021-04-22
Payer: MEDICARE

## 2021-04-22 VITALS
DIASTOLIC BLOOD PRESSURE: 87 MMHG | HEIGHT: 62 IN | SYSTOLIC BLOOD PRESSURE: 146 MMHG | HEART RATE: 74 BPM | WEIGHT: 200 LBS | BODY MASS INDEX: 36.8 KG/M2

## 2021-04-22 DIAGNOSIS — I10 ESSENTIAL HYPERTENSION: ICD-10-CM

## 2021-04-22 DIAGNOSIS — R94.31 ABNORMAL EKG: ICD-10-CM

## 2021-04-22 DIAGNOSIS — R07.89 CHEST PAIN, ATYPICAL: Primary | ICD-10-CM

## 2021-04-22 DIAGNOSIS — R00.2 HEART PALPITATIONS: ICD-10-CM

## 2021-04-22 DIAGNOSIS — Z82.49 FAMILY HISTORY OF PREMATURE CAD: ICD-10-CM

## 2021-04-22 DIAGNOSIS — E78.00 PURE HYPERCHOLESTEROLEMIA: ICD-10-CM

## 2021-04-22 PROCEDURE — G8427 DOCREV CUR MEDS BY ELIG CLIN: HCPCS | Performed by: INTERNAL MEDICINE

## 2021-04-22 PROCEDURE — G8417 CALC BMI ABV UP PARAM F/U: HCPCS | Performed by: INTERNAL MEDICINE

## 2021-04-22 PROCEDURE — 3017F COLORECTAL CA SCREEN DOC REV: CPT | Performed by: INTERNAL MEDICINE

## 2021-04-22 PROCEDURE — 1036F TOBACCO NON-USER: CPT | Performed by: INTERNAL MEDICINE

## 2021-04-22 PROCEDURE — 99204 OFFICE O/P NEW MOD 45 MIN: CPT | Performed by: INTERNAL MEDICINE

## 2021-04-22 NOTE — PROGRESS NOTES
Spouse name: Not on file    Number of children: Not on file    Years of education: Not on file    Highest education level: Not on file   Occupational History    Not on file   Social Needs    Financial resource strain: Not hard at all    Food insecurity     Worry: Never true     Inability: Never true   [employer] needs     Medical: No     Non-medical: No   Tobacco Use    Smoking status: Former Smoker     Packs/day: 1.00     Years: 10.00     Pack years: 10.00     Quit date: 1990     Years since quittin.3    Smokeless tobacco: Never Used   Substance and Sexual Activity    Alcohol use: No    Drug use: No    Sexual activity: Yes     Partners: Male   Lifestyle    Physical activity     Days per week: Not on file     Minutes per session: Not on file    Stress: Not on file   Relationships    Social connections     Talks on phone: Not on file     Gets together: Not on file     Attends Taoist service: Not on file     Active member of club or organization: Not on file     Attends meetings of clubs or organizations: Not on file     Relationship status: Not on file    Intimate partner violence     Fear of current or ex partner: Not on file     Emotionally abused: Not on file     Physically abused: Not on file     Forced sexual activity: Not on file   Other Topics Concern    Not on file   Social History Narrative    Not on file       Current Outpatient Medications   Medication Sig Dispense Refill    aspirin EC 81 MG EC tablet Take 1 tablet by mouth daily 90 tablet 1    metoclopramide (REGLAN) 10 MG tablet TAKE 1 TABLET BY MOUTH FOUR TIMES DAILY 120 tablet 3    sucralfate (CARAFATE) 1 GM tablet Take 1 tablet by mouth 4 times daily 120 tablet 3    pantoprazole (PROTONIX) 40 MG tablet TAKE 1 TABLET TWICE DAILY BEFORE MEALS 60 tablet 5    rosuvastatin (CRESTOR) 40 MG tablet take 1 tablet by mouth every week 90 tablet 3    nitroGLYCERIN (NITROSTAT) 0.4 MG SL tablet up to max of 3 total doses. If no relief after 1 dose, call [phone]. 55 tablet 3     No current facility-administered medications for this visit. Review of Systems -     General ROS: negative  Psychological ROS: negative  Hematological and Lymphatic ROS: No history of blood clots or bleeding disorder. Respiratory ROS: no cough,  or wheezing, the rest see HPI  Cardiovascular ROS: See HPI  Gastrointestinal ROS: negative  Genito-Urinary ROS: no dysuria, trouble voiding, or hematuria  Musculoskeletal ROS: negative  Neurological ROS: no TIA or stroke symptoms  Dermatological ROS: negative      Blood pressure (!) 146/87, pulse 74, height 5' 2\" (1.575 m), weight 200 lb (90.7 kg), not currently breastfeeding. Physical Examination:    General appearance - alert, well appearing, and in no distress  HEENT- Pink conjunctiva  , Non-icteri sclera,PERRLA  Mental status - alert, oriented to person, place, and time  Neck - supple, no significant adenopathy, no JVD, or carotid bruits  Chest - clear to auscultation, no wheezes, rales or rhonchi, symmetric air entry  Heart - normal rate, regular rhythm, normal S1, S2, no murmurs, rubs, clicks or gallops  Abdomen - soft, nontender, nondistended, no masses or organomegaly  GUIDO- no CVA or flank tenderness, no suprapubic tenderness  Neurological - alert, oriented, normal speech, no focal findings or movement disorder noted  Musculoskeletal/limbs - no joint tenderness, deformity or swelling   - peripheral pulses normal, no pedal edema, no clubbing or cyanosis  Skin - normal coloration and turgor, no rashes, no suspicious skin lesions noted  Psych- appropriate mood and affect    Lab  No results for input(s): CKTOTAL, CKMB, CKMBINDEX, TROPONINI in the last 72 hours.   CBC:   Lab Results   Component Value Date    WBC 5.1 10/16/2020    RBC 4.52 10/16/2020    RBC 4.53 12/28/2011    HGB 12.9 10/16/2020    HCT 40.5 10/16/2020    MCV 89.6 10/16/2020    MCH 28.5 10/16/2020    MCHC 31.9 10/16/2020    RDW 14.5 05/08/2017     10/16/2020    MPV 10.9 10/16/2020     BMP:    Lab Results   Component Value Date     10/16/2020    K 3.9 10/16/2020     10/16/2020    CO2 26 10/16/2020    BUN 6 10/16/2020    LABALBU 3.9 08/18/2018    LABALBU 4.8 12/28/2011    CREATININE 0.9 10/16/2020    CALCIUM 9.6 10/16/2020    LABGLOM 77 10/16/2020    GLUCOSE 118 10/16/2020    GLUCOSE 111 12/28/2011     Hepatic Function Panel:    Lab Results   Component Value Date    ALKPHOS 52 08/18/2018    ALT 24 08/18/2018    AST 30 08/18/2018    PROT 7.0 08/18/2018    BILITOT 0.3 08/18/2018    BILIDIR <0.2 08/18/2018    LABALBU 3.9 08/18/2018    LABALBU 4.8 12/28/2011     Magnesium:  No results found for: MG  Warfarin PT/INR:  No components found for: PTPATWAR, PTINRWAR  HgBA1c:    Lab Results   Component Value Date    LABA1C 6.4 08/21/2015     FLP:    Lab Results   Component Value Date    TRIG 186 10/16/2020    HDL 50 10/16/2020    [address] 153 10/16/2020     TSH:    Lab Results   Component Value Date    TSH 1.820 10/16/2020      Conclusions      Summary   Doppler parameters were consistent with abnormal left ventricular   relaxation (grade 1 diastolic dysfunction). Normal left ventricle size and systolic function. Ejection fraction was   estimated at 50--%. There were no regional left ventricular wall motion   abnormalities and increase wall thickness possible metabolic disease   limits. Doppler parameters were consistent with abnormal left ventricular   relaxation (grade 1 diastolic dysfunction). Signature      ----------------------------------------------------------------   Electronically signed by Ria So MD (Interpreting   physician) on 08/20/2018 at 07:28 AM   ----------------------------------------------------------------        CORONARY ANGIOGRAPHY:  1. The RCA is a nondominant vessel, is a large caliber vessel with about 10%  to 20% diffuse disease.   2.  The left main:  This is a large vessel which bifurcates into circumflex  and LAD without critical lesion. 3.  Circumflex is a dominant vessel. It has about 20% proximal lesion and  gives rise to OM1, which is a large caliber vessel without critical lesion. 4. LAD is a large vessel with about 30% very proximal lesion and about maybe  20% to 30% very proximal lesion and it gives rise to a moderate sized  diagonal.     CONCLUSION:  1. The LAD is a large vessel with about 20% to 30% proximal lesion. 2.  Left main is a large vessel, widely patent. 3.  Circumflex is a large vessel, has about 20% lesion. 4.  OM-1 is a large vessel without occlusive disease. 5.  RCA is a nondominant vessel. 6.  No complications occurred. Adequate hemostasis was obtained  post-procedure. The patient to avoid driving or lifting for the next 2 days.          JOSE F Alicea        D: 05/08/2017 18:56:13  T: 05/08/2017 19:25:48  MATHEW/michael  Job#: [id number]  Doc#: [id number]   Conclusions      Summary   Lexiscan EKG stress test is non diagnostic for ischemia due to baseline ST   depression and TWI. The nuclear images is not suggestive for myocardial ischemia. Recommendation   Clinical correlation is recommended due to poor image quality. Signatures      ----------------------------------------------------------------   Electronically signed by Amol Hathaway MD (Interpreting   Cardiologist) on 04/19/2021 at 18:59       Conclusions      Summary   Doppler parameters were consistent with abnormal left ventricular   relaxation (grade 1 diastolic dysfunction). Normal left ventricle size and systolic function. Ejection fraction was   estimated at 50--%. There were no regional left ventricular wall motion   abnormalities and increase wall thickness possible metabolic disease   limits. Doppler parameters were consistent with abnormal left ventricular   relaxation (grade 1 diastolic dysfunction).       Signature      ----------------------------------------------------------------   Electronically

## 2021-05-04 ENCOUNTER — HOSPITAL ENCOUNTER (OUTPATIENT)
Dept: NON INVASIVE DIAGNOSTICS | Age: 61
Discharge: HOME | End: 2021-05-04
Payer: MEDICARE

## 2021-05-04 DIAGNOSIS — R07.89 CHEST PAIN, ATYPICAL: ICD-10-CM

## 2021-05-04 DIAGNOSIS — R00.2 HEART PALPITATIONS: ICD-10-CM

## 2021-05-04 DIAGNOSIS — E78.00 PURE HYPERCHOLESTEROLEMIA: ICD-10-CM

## 2021-05-04 DIAGNOSIS — R94.31 ABNORMAL EKG: ICD-10-CM

## 2021-05-04 DIAGNOSIS — I10 ESSENTIAL HYPERTENSION: ICD-10-CM

## 2021-05-04 DIAGNOSIS — Z82.49 FAMILY HISTORY OF PREMATURE CAD: ICD-10-CM

## 2021-05-04 LAB
LV EF: 60 %
LVEF MODALITY: NORMAL

## 2021-05-04 PROCEDURE — 93246 EXT ECG>7D<15D RECORDING: CPT

## 2021-05-04 PROCEDURE — 93306 TTE W/DOPPLER COMPLETE: CPT

## 2021-05-11 RX ORDER — PANTOPRAZOLE SODIUM 40 MG/1
TABLET, DELAYED RELEASE ORAL
Qty: 60 TABLET | Refills: 5 | Status: SHIPPED | OUTPATIENT
Start: 2021-05-11 | End: 2021-11-08

## 2021-05-26 ENCOUNTER — TELEPHONE (OUTPATIENT)
Dept: CARDIOLOGY CLINIC | Age: 61
End: 2021-05-26

## 2021-05-26 NOTE — TELEPHONE ENCOUNTER
Raquel Rainey from [facility] called stating they placed a 2 week cardiac monitor per order and it only recorded for 2 days. They called patient back to have her hooked back up and she declined. OV note from 4/22/2021  Echo  Palpitation 2 x/ week  zio patch for 2 weeks  If no arrhythmia noted consider cardiac cath    Watch for results.

## 2021-06-03 ENCOUNTER — TELEPHONE (OUTPATIENT)
Dept: CARDIOLOGY CLINIC | Age: 61
End: 2021-06-03

## 2021-06-03 NOTE — LETTER
[address] [facility]  [facility].  [address]  [facility] [address]  Phone: [phone]  Fax: [phone]      Soledad 3, 2021     Aron Daleyet  [address]  [address] [place] [address] [address] [address]      Dear Abelino Barksdale:    I am writing you because I have been informed of your missed appointment(s). We care about you and the management of your healthcare and want to make sure that you follow up as recommended. We're sorry you were unable to keep your appointment and hope that you are doing well. We would like to continue treating your healthcare needs. Please call the office to let us know your plans for treatment and to reschedule your appointment.      Sincerely,        Carmen Mabry MD

## 2021-06-25 ENCOUNTER — TELEPHONE (OUTPATIENT)
Dept: CARDIOLOGY CLINIC | Age: 61
End: 2021-06-25

## 2021-06-25 NOTE — TELEPHONE ENCOUNTER
Patient LM on nurse line requesting a call back at [phone]. Called patient back. No answer. Unable to leave a message.

## 2021-06-28 ENCOUNTER — TELEPHONE (OUTPATIENT)
Dept: CARDIOLOGY CLINIC | Age: 61
End: 2021-06-28

## 2021-06-28 NOTE — LETTER
[address] [facility]  [facility].  [address] [facility] [address]  Phone: [phone]  Fax: [phone]      June 28, 2021     [place]  [address]  [address] [address]      Dear Lev Cary:    I am writing you because I have been informed of your missed appointment(s). We care about you and the management of your healthcare and want to make sure that you follow up as recommended. We're sorry you were unable to keep your appointment and hope that you are doing well. We would like to continue treating your healthcare needs. Please call the office to let us know your plans for treatment and to reschedule your appointment.      Sincerely,        Cain Quiles MD

## 2021-11-30 ENCOUNTER — TELEPHONE (OUTPATIENT)
Dept: FAMILY MEDICINE CLINIC | Age: 61
End: 2021-11-30

## 2021-11-30 NOTE — TELEPHONE ENCOUNTER
----- Message from Darcy Moran sent at 11/29/2021  4:06 PM EST -----  Subject: Referral Request    QUESTIONS   Reason for referral request? for her sinus , said she is having issues   with left nostril   Has the physician seen you for this condition before? No   Preferred Specialist (if applicable)? Do you already have an appointment scheduled? No  Additional Information for Provider? pt doesn't have any in mind at this   time   ---------------------------------------------------------------------------  --------------  [address]  What is the best way for the office to contact you? OK to leave message on   voicemail  Preferred Call Back Phone Number?  [phone]

## 2021-12-02 NOTE — TELEPHONE ENCOUNTER
Spoke with patient that we will need to see her first and that she can walk in through the walk in clinic to be seen. Patient stated understanding.

## 2021-12-27 RX ORDER — ROSUVASTATIN CALCIUM 40 MG/1
TABLET, COATED ORAL
Qty: 90 TABLET | Refills: 3 | Status: SHIPPED | OUTPATIENT
Start: 2021-12-27

## 2022-02-20 ENCOUNTER — TELEPHONE (OUTPATIENT)
Dept: FAMILY MEDICINE CLINIC | Age: 62
End: 2022-02-20

## 2022-02-21 NOTE — TELEPHONE ENCOUNTER
Patient called just after midnight complaining of terrible toothache. States she has tried 2 tylenol and that did not touch the pain. She states she has also had some swelling on the side of her face. Recommend trying ice pack/ ibuprofen but Patient is unsure if she could wait til pharmacy or urgent care open in the morning as she states she is \"absolutely miserable. \"  She has no known fevers. Patient states she will go to ER if pain remains this severe. If she has other questions or concerns, patient instructed to call back.

## 2022-06-08 DIAGNOSIS — K31.84 GASTROPARESIS: ICD-10-CM

## 2022-06-08 RX ORDER — METOCLOPRAMIDE 10 MG/1
TABLET ORAL
Qty: 120 TABLET | Refills: 3 | Status: SHIPPED | OUTPATIENT
Start: 2022-06-08

## 2022-06-11 ENCOUNTER — OFFICE VISIT (OUTPATIENT)
Dept: FAMILY MEDICINE CLINIC | Age: 62
End: 2022-06-11
Payer: MEDICARE

## 2022-06-11 VITALS
WEIGHT: 196 LBS | DIASTOLIC BLOOD PRESSURE: 70 MMHG | HEART RATE: 50 BPM | OXYGEN SATURATION: 99 % | BODY MASS INDEX: 36.07 KG/M2 | TEMPERATURE: 96.8 F | HEIGHT: 62 IN | RESPIRATION RATE: 16 BRPM | SYSTOLIC BLOOD PRESSURE: 130 MMHG

## 2022-06-11 DIAGNOSIS — E78.5 DYSLIPIDEMIA: ICD-10-CM

## 2022-06-11 DIAGNOSIS — I47.1 SVT (SUPRAVENTRICULAR TACHYCARDIA) (HCC): ICD-10-CM

## 2022-06-11 DIAGNOSIS — L20.84 INTRINSIC ECZEMA: ICD-10-CM

## 2022-06-11 DIAGNOSIS — Z00.00 MEDICARE ANNUAL WELLNESS VISIT, SUBSEQUENT: Primary | ICD-10-CM

## 2022-06-11 DIAGNOSIS — Q01.9 CEPHALOCELE (HCC): ICD-10-CM

## 2022-06-11 DIAGNOSIS — S00.01XA ABRASION OF SCALP, INITIAL ENCOUNTER: ICD-10-CM

## 2022-06-11 PROCEDURE — G0439 PPPS, SUBSEQ VISIT: HCPCS | Performed by: FAMILY MEDICINE

## 2022-06-11 PROCEDURE — 3017F COLORECTAL CA SCREEN DOC REV: CPT | Performed by: FAMILY MEDICINE

## 2022-06-11 RX ORDER — MUPIROCIN CALCIUM 20 MG/G
CREAM TOPICAL
Qty: 1 EACH | Refills: 1 | Status: SHIPPED | OUTPATIENT
Start: 2022-06-11 | End: 2022-07-11

## 2022-06-11 SDOH — ECONOMIC STABILITY: FOOD INSECURITY: WITHIN THE PAST 12 MONTHS, YOU WORRIED THAT YOUR FOOD WOULD RUN OUT BEFORE YOU GOT MONEY TO BUY MORE.: NEVER TRUE

## 2022-06-11 SDOH — ECONOMIC STABILITY: FOOD INSECURITY: WITHIN THE PAST 12 MONTHS, THE FOOD YOU BOUGHT JUST DIDN'T LAST AND YOU DIDN'T HAVE MONEY TO GET MORE.: NEVER TRUE

## 2022-06-11 ASSESSMENT — PATIENT HEALTH QUESTIONNAIRE - PHQ9
2. FEELING DOWN, DEPRESSED OR HOPELESS: 1
SUM OF ALL RESPONSES TO PHQ QUESTIONS 1-9: 1
SUM OF ALL RESPONSES TO PHQ9 QUESTIONS 1 & 2: 1
SUM OF ALL RESPONSES TO PHQ QUESTIONS 1-9: 1
1. LITTLE INTEREST OR PLEASURE IN DOING THINGS: 0

## 2022-06-11 ASSESSMENT — LIFESTYLE VARIABLES
HOW OFTEN DO YOU HAVE A DRINK CONTAINING ALCOHOL: NEVER
HOW MANY STANDARD DRINKS CONTAINING ALCOHOL DO YOU HAVE ON A TYPICAL DAY: 1 OR 2

## 2022-06-11 ASSESSMENT — SOCIAL DETERMINANTS OF HEALTH (SDOH): HOW HARD IS IT FOR YOU TO PAY FOR THE VERY BASICS LIKE FOOD, HOUSING, MEDICAL CARE, AND HEATING?: NOT HARD AT ALL

## 2022-06-11 NOTE — PROGRESS NOTES
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)  SVT (supraventricular tachycardia) ([facility] [address]      Recommendations for Preventive Services Due: see orders and patient instructions/AVS.  Recommended screening schedule for the next 5-10 years is provided to the patient in written form: see Patient Instructions/AVS.     No follow-ups on file. Subjective       Patient's complete Health Risk Assessment and screening values have been reviewed and are found in Flowsheets. The following problems were reviewed today and where indicated follow up appointments were made and/or referrals ordered.     Positive Risk Factor Screenings with Interventions:             General Health and ACP:  General  In general, how would you say your health is?: Fair  In the past 7 days, have you experienced any of the following: New or Increased Pain, New or Increased Fatigue, Loneliness, Social Isolation, Stress or Anger?: No  Do you get the social and emotional support that you need?: Yes  Do you have a Living Will?: (!) No    Advance Directives     Power of [address] [place] [address] Will ACP-Advance Directive ACP-Power of     Not on File Not on File Not on File Not on File      General Health Risk Interventions:  · No Living Will: Advance Care Planning addressed with patient today    Health Habits/Nutrition:     Physical Activity: Inactive    Days of Exercise per Week: 0 days    Minutes of Exercise per Session: 0 min     Have you lost any weight without trying in the past 3 months?: No  Body mass index: (!) 35.84  Have you seen the dentist within the past year?: Yes    Health Habits/Nutrition Interventions:  · Inadequate physical activity:  encouraged on regular physical activity as tolerated    Hearing/Vision:  Do you or your family notice any trouble with your hearing that hasn't been managed with hearing aids?: No  Do you have difficulty driving, watching TV, or doing any of your daily activities because of your eyesight?: (!) Yes  Have you had an eye exam within the past year?: Yes  No exam data present    Hearing/Vision Interventions:  · Vision concerns:  patient encouraged to make appointment with his/her eye specialist            Objective   Vitals:    06/11/22 0913   BP: 130/70   Pulse: 50   Resp: 16   Temp: 96.8 °F (36 °C)   TempSrc: Infrared   SpO2: 99%   Weight: 196 lb (88.9 kg)   Height: 5' 2\" (1.575 m)      Body mass index is 35.85 kg/m². Allergies   Allergen Reactions    Cephalexin     Estrogens Hives and Itching    Prednisone     Statins      Myalgias    Sulfa Antibiotics      Prior to Visit Medications    Medication Sig Taking? Authorizing Provider   metoclopramide (REGLAN) 10 MG tablet TAKE 1 TABLET BY MOUTH FOUR TIMES DAILY  Jose Luis Prude Pingle, APRN - CNP   rosuvastatin (CRESTOR) 40 MG tablet TAKE 1 TABLET BY MOUTH EVERY WEEK  Wilfredo D Susana, DO   pantoprazole (PROTONIX) 40 MG tablet TAKE 1 TABLET BY MOUTH TWICE DAILY BEFORE MEALS  Wilfredo D Susana, DO   aspirin EC 81 MG EC tablet Take 1 tablet by mouth daily  Wilfredo D Susana, DO   sucralfate (CARAFATE) 1 GM tablet Take 1 tablet by mouth 4 times daily  Wilfredo D Susana, DO   nitroGLYCERIN (NITROSTAT) 0.4 MG SL tablet up to max of 3 total doses. If no relief after 1 dose, call 911.   Savanah Delaney MD       [facility] (Including outside providers/suppliers regularly involved in providing care):   Patient Care Team:  Hoang Carter DO as PCP - General (Family Medicine)  Hoang Carter DO as PCP - [facility]  Radha Allison MD as Cardiologist (Cardiology)     Reviewed and updated this visit:  Tobacco  Allergies  Meds  Med Hx  Surg Hx  Soc Hx  Fam Hx

## 2022-06-11 NOTE — PROGRESS NOTES
Bal Lorenz is a 57 y.o. female that presents for     Chief Complaint   Patient presents with    Medicare AWV    Pruritis    Lesion(s)     on top of head    Knee Pain     left       /70   Pulse 50   Temp 96.8 °F (36 °C) (Infrared)   Resp 16   Ht 5' 2\" (1.575 m)   Wt 196 lb (88.9 kg)   SpO2 99%   BMI 35.85 kg/m²       HPI    Notes diffuse itching of the skin. States that this typically occurs in the early summer. Sx started about 1 week ago. Skin Lesion    HPI:    Location - scalp  Length of time it has been present - 6+ months  Recent change in size, color or shape? - Yes - has gotten a little bigger  Pruritic? No  Bleeding or drainage? Yes - clear fluid  Painful?   Yes - when grooming      Health Maintenance   Topic Date Due    HIV screen  Never done    Hepatitis C screen  Never done    DTaP/Tdap/Td vaccine (1 - Tdap) Never done    Shingles vaccine (1 of 2) Never done    A1C test (Diabetic or Prediabetic)  11/21/2015    Lipids  10/16/2021    Depression Screen  01/29/2022    Flu vaccine (Season Ended) 09/01/2022    Breast cancer screen  04/14/2023    Annual Wellness Visit (AWV)  06/12/2023    Colorectal Cancer Screen  10/22/2029    COVID-19 Vaccine  Completed    Hepatitis A vaccine  Aged Out    Hepatitis B vaccine  Aged Out    Hib vaccine  Aged Out    Meningococcal (ACWY) vaccine  Aged Out    Pneumococcal 0-64 years Vaccine  Aged Out       Past Medical History:   Diagnosis Date    Brain cyst     GERD (gastroesophageal reflux disease)     Hyperlipidemia     Hypertension        Past Surgical History:   Procedure Laterality Date    COLONOSCOPY  2009    COLONOSCOPY Left 10/22/2019    COLONOSCOPY DIAGNOSTIC performed by Wilfrido Mi MD at [address] 2017    EYE SURGERY      HYSTERECTOMY (CERVIX STATUS UNKNOWN)      UPPER GASTROINTESTINAL ENDOSCOPY  2016       Social History     Tobacco Use    Smoking status: Former Smoker     Packs/day: 1.00 Years: 10.00     Pack years: 10.00     Quit date: 1990     Years since quittin.4    Smokeless tobacco: Never Used   Substance Use Topics    Alcohol use: No    Drug use: No       Family History   Problem Relation Age of Onset    Heart Disease Mother     High Blood Pressure Mother     Diabetes Mother     High Blood Pressure Father     Breast Cancer Sister     Colon Cancer Neg Hx     Cancer Neg Hx          I have reviewed the patient's past medical history, past surgical history, allergies, medications, social and family history and I have made updates where appropriate. Review of Systems        PHYSICAL EXAM:  /70   Pulse 50   Temp 96.8 °F (36 °C) (Infrared)   Resp 16   Ht 5' 2\" (1.575 m)   Wt 196 lb (88.9 kg)   SpO2 99%   BMI 35.85 kg/m²     Physical Exam  Vitals and nursing note reviewed. Constitutional:       General: She is not in acute distress. Appearance: She is well-developed. HENT:      Head: Normocephalic and atraumatic. Right Ear: Hearing and external ear normal.      Left Ear: Hearing and external ear normal.      Nose: Nose normal.   Eyes:      General:         Right eye: No discharge. Left eye: No discharge. Conjunctiva/sclera: Conjunctivae normal.   Neck:      Thyroid: No thyromegaly. Trachea: No tracheal deviation. Cardiovascular:      Rate and Rhythm: Normal rate and regular rhythm. Heart sounds: Normal heart sounds. No murmur heard. No friction rub. No gallop. Pulmonary:      Effort: Pulmonary effort is normal. No respiratory distress. Breath sounds: No wheezing or rales. Chest:      Chest wall: No tenderness. Musculoskeletal:         General: No deformity. Cervical back: Normal range of motion and neck supple. Lymphadenopathy:      Cervical: No cervical adenopathy. Skin:     General: Skin is warm and dry. Findings: Lesion (superficial abrasion of the mid parietal scalp) present.  No erythema or rash.   Neurological:      Mental Status: She is alert. Motor: No abnormal muscle tone. Coordination: Coordination normal.   Psychiatric:         Behavior: Behavior normal.         Thought Content: Thought content normal.         Judgment: Judgment normal.             ASSESSMENT & PLAN  Barbi Randle was seen today for medicare awv, pruritis, lesion(s) and knee pain. Diagnoses and all orders for this visit:    Medicare annual wellness visit, subsequent    Cephalocele ([facility] [address]    SVT (supraventricular tachycardia) ([facility] [address]  -     Comprehensive Metabolic Panel; Future  -     Lipid Panel; Future    Abrasion of scalp, initial encounter  -     mupirocin (BACTROBAN) 2 % cream; Apply 3 times daily. Use for scalp. Intrinsic eczema  -     betamethasone valerate (VALISONE) 0.1 % cream; Apply topically 2 times daily. Use for itching    Dyslipidemia  -     Comprehensive Metabolic Panel; Future  -     Lipid Panel; Future        Return in about 1 year (around 6/11/2023). Start above treatments, Obtain above testing and Follow up with our practice if no improvement or worsening sx. The most recent results of the following tests were reviewed with the patient today: Lipid Panel     All copied or forwarded information in the progress note was verified by me to be accurate at the time of visit  Patient's past medical, surgical, social and family history were reviewed and updated     All patient questions answered. Patient voiced understanding.

## 2022-06-11 NOTE — PATIENT INSTRUCTIONS
Personalized Preventive Plan for Hiral Chacko - 6/11/2022  Medicare offers a range of preventive health benefits. Some of the tests and screenings are paid in full while other may be subject to a deductible, co-insurance, and/or copay. Some of these benefits include a comprehensive review of your medical history including lifestyle, illnesses that may run in your family, and various assessments and screenings as appropriate. After reviewing your medical record and screening and assessments performed today your provider may have ordered immunizations, labs, imaging, and/or referrals for you. A list of these orders (if applicable) as well as your Preventive Care list are included within your After Visit Summary for your review. Other Preventive Recommendations:    · A preventive eye exam performed by an eye specialist is recommended every 1-2 years to screen for glaucoma; cataracts, macular degeneration, and other eye disorders. · A preventive dental visit is recommended every 6 months. · Try to get at least 150 minutes of exercise per week or 10,000 steps per day on a pedometer . · Order or download the FREE \"Exercise & Physical Activity: Your Everyday Guide\" from [employer]. Call 6-276.782.2722 or search [employer] online. · You need 0818-9366 mg of calcium and 8536-1030 IU of vitamin D per day. It is possible to meet your calcium requirement with diet alone, but a vitamin D supplement is usually necessary to meet this goal.  · When exposed to the sun, use a sunscreen that protects against both UVA and UVB radiation with an SPF of 30 or greater. Reapply every 2 to 3 hours or after sweating, drying off with a towel, or swimming. · Always wear a seat belt when traveling in a car. Always wear a helmet when riding a bicycle or motorcycle.

## 2022-06-16 ENCOUNTER — TELEPHONE (OUTPATIENT)
Dept: FAMILY MEDICINE CLINIC | Age: 62
End: 2022-06-16

## 2022-06-16 DIAGNOSIS — S00.01XA ABRASION OF SCALP, INITIAL ENCOUNTER: Primary | ICD-10-CM

## 2022-06-16 NOTE — TELEPHONE ENCOUNTER
Patient called in stating her insurance will not pay for the Bactroban cream. She stated they may pay for it if it was an ointment.

## 2022-09-15 ENCOUNTER — HOSPITAL ENCOUNTER (EMERGENCY)
Age: 62
Discharge: HOME | End: 2022-09-15
Attending: EMERGENCY MEDICINE
Payer: MEDICARE

## 2022-09-15 ENCOUNTER — APPOINTMENT (OUTPATIENT)
Dept: GENERAL RADIOLOGY | Age: 62
End: 2022-09-15
Payer: MEDICARE

## 2022-09-15 VITALS
SYSTOLIC BLOOD PRESSURE: 147 MMHG | BODY MASS INDEX: 35.88 KG/M2 | TEMPERATURE: 96.3 F | HEART RATE: 75 BPM | OXYGEN SATURATION: 97 % | DIASTOLIC BLOOD PRESSURE: 71 MMHG | HEIGHT: 62 IN | WEIGHT: 195 LBS | RESPIRATION RATE: 20 BRPM

## 2022-09-15 DIAGNOSIS — S60.411A ABRASION OF LEFT INDEX FINGER, INITIAL ENCOUNTER: ICD-10-CM

## 2022-09-15 DIAGNOSIS — S67.191A CRUSHING INJURY OF LEFT INDEX FINGER, INITIAL ENCOUNTER: Primary | ICD-10-CM

## 2022-09-15 DIAGNOSIS — S60.122A CONTUSION OF LEFT INDEX FINGER WITH DAMAGE TO NAIL, INITIAL ENCOUNTER: ICD-10-CM

## 2022-09-15 PROCEDURE — 6370000000 HC RX 637 (ALT 250 FOR IP): Performed by: EMERGENCY MEDICINE

## 2022-09-15 PROCEDURE — 90715 TDAP VACCINE 7 YRS/> IM: CPT | Performed by: EMERGENCY MEDICINE

## 2022-09-15 PROCEDURE — 90471 IMMUNIZATION ADMIN: CPT | Performed by: EMERGENCY MEDICINE

## 2022-09-15 PROCEDURE — 99213 OFFICE O/P EST LOW 20 MIN: CPT

## 2022-09-15 PROCEDURE — 6360000002 HC RX W HCPCS: Performed by: EMERGENCY MEDICINE

## 2022-09-15 PROCEDURE — 29130 APPL FINGER SPLINT STATIC: CPT

## 2022-09-15 PROCEDURE — 99213 OFFICE O/P EST LOW 20 MIN: CPT | Performed by: EMERGENCY MEDICINE

## 2022-09-15 PROCEDURE — 73140 X-RAY EXAM OF FINGER(S): CPT

## 2022-09-15 RX ORDER — GINSENG 100 MG
CAPSULE ORAL ONCE
Status: COMPLETED | OUTPATIENT
Start: 2022-09-15 | End: 2022-09-15

## 2022-09-15 RX ADMIN — TETANUS TOXOID, REDUCED DIPHTHERIA TOXOID AND ACELLULAR PERTUSSIS VACCINE, ADSORBED 0.5 ML: 5; 2.5; 8; 8; 2.5 SUSPENSION INTRAMUSCULAR at 20:23

## 2022-09-15 RX ADMIN — BACITRACIN: 500 OINTMENT TOPICAL at 20:27

## 2022-09-15 ASSESSMENT — ENCOUNTER SYMPTOMS
VOICE CHANGE: 0
CONSTIPATION: 0
SHORTNESS OF BREATH: 0
ABDOMINAL PAIN: 0
VOMITING: 0
COUGH: 0
CHOKING: 0
STRIDOR: 0
SORE THROAT: 0
SINUS PRESSURE: 0
EYE DISCHARGE: 0
BACK PAIN: 0
FACIAL SWELLING: 0
WHEEZING: 0
BLOOD IN STOOL: 0
DIARRHEA: 0
EYE PAIN: 0
NAUSEA: 0
TROUBLE SWALLOWING: 0
EYE REDNESS: 0

## 2022-09-15 ASSESSMENT — PAIN - FUNCTIONAL ASSESSMENT
PAIN_FUNCTIONAL_ASSESSMENT: PREVENTS OR INTERFERES SOME ACTIVE ACTIVITIES AND ADLS
PAIN_FUNCTIONAL_ASSESSMENT: 0-10

## 2022-09-15 ASSESSMENT — PAIN DESCRIPTION - DESCRIPTORS: DESCRIPTORS: DISCOMFORT

## 2022-09-15 ASSESSMENT — PAIN DESCRIPTION - FREQUENCY: FREQUENCY: CONTINUOUS

## 2022-09-15 ASSESSMENT — PAIN DESCRIPTION - LOCATION: LOCATION: FINGER (COMMENT WHICH ONE)

## 2022-09-15 ASSESSMENT — PAIN DESCRIPTION - ORIENTATION: ORIENTATION: LEFT

## 2022-09-15 ASSESSMENT — PAIN DESCRIPTION - ONSET: ONSET: SUDDEN

## 2022-09-15 ASSESSMENT — PAIN DESCRIPTION - PAIN TYPE: TYPE: ACUTE PAIN

## 2022-09-15 ASSESSMENT — PAIN SCALES - GENERAL: PAINLEVEL_OUTOF10: 10

## 2022-09-15 NOTE — ED TRIAGE NOTES
Pt to urgent care due to a left finger injury that occurred just prior to arrival. Pt states she accidentally slammed her car door on her finger causing the injury.

## 2022-09-16 NOTE — ED PROVIDER NOTES
[address] [facility] Encounter      [address] [facility]       Chief Complaint   Patient presents with    Finger Injury     Shut in the car door       Nurses Notes reviewed and I agree except as noted in the HPI. HISTORY OF PRESENT ILLNESS   Meron Arteaga is a 60 y.o. female who presents 1/2-hour after she sustained injury to the distal aspect of her left index finger when she crushed it in a car door. Injury occurred in [address] Tavares rates pain at 10 out of 10 in severity. There is abrasion. No active bleeding. No deformity or laceration. No fall to the ground. Left-hand-dominant. Not up-to-date tetanus Quit smoking. REVIEW OF SYSTEMS     Review of Systems   Constitutional:  Negative for appetite change, chills, fatigue, fever and unexpected weight change. Normal appetite no fever   HENT:  Negative for congestion, ear discharge, ear pain, facial swelling, hearing loss, nosebleeds, postnasal drip, sinus pressure, sore throat, trouble swallowing and voice change. No facial injury   Eyes:  Negative for pain, discharge, redness and visual disturbance. No redness or drainage   Respiratory:  Negative for cough, choking, shortness of breath, wheezing and stridor. No cough or shortness of breath   Cardiovascular:  Negative for chest pain and leg swelling. No chest pain or syncope   Gastrointestinal:  Negative for abdominal pain, blood in stool, constipation, diarrhea, nausea and vomiting. No abdominal pain or vomiting   Genitourinary:  Negative for dysuria, flank pain, frequency, hematuria, urgency, vaginal bleeding and vaginal discharge. Musculoskeletal:  Negative for arthralgias, back pain, neck pain and neck stiffness. Crush injury distal left index finger with pain and swelling   Skin:  Negative for rash.         Abrasion distal left index finger   Neurological:  Negative for dizziness, seizures, syncope, weakness, light-headedness and headaches. No head injury or lethargy   Hematological:  Negative for adenopathy. Does not bruise/bleed easily. Psychiatric/Behavioral:  Negative for confusion, sleep disturbance and suicidal ideas. The patient is not nervous/anxious. Red and bold elements reviewed  PAST MEDICAL HISTORY         Diagnosis Date    Brain cyst     GERD (gastroesophageal reflux disease)     Hyperlipidemia     Hypertension        SURGICAL HISTORY     Patient  has a past surgical history that includes Hysterectomy; eye surgery (Right, 2017); Eye surgery; Colonoscopy (2009); Upper gastrointestinal endoscopy (2016); and Colonoscopy (Left, 10/22/2019). CURRENT MEDICATIONS       Discharge Medication List as of 9/15/2022  8:30 PM        CONTINUE these medications which have NOT CHANGED    Details   metoclopramide (REGLAN) 10 MG tablet TAKE 1 TABLET BY MOUTH FOUR TIMES DAILY, Disp-120 tablet, R-3Normal      rosuvastatin (CRESTOR) 40 MG tablet TAKE 1 TABLET BY MOUTH EVERY WEEK, Disp-90 tablet, R-3Normal      pantoprazole (PROTONIX) 40 MG tablet TAKE 1 TABLET BY MOUTH TWICE DAILY BEFORE MEALS, Disp-180 tablet, R-3Normal      aspirin EC 81 MG EC tablet Take 1 tablet by mouth daily, Disp-90 tablet, R-1Normal      sucralfate (CARAFATE) 1 GM tablet Take 1 tablet by mouth 4 times daily, Disp-120 tablet, R-3Normal      nitroGLYCERIN (NITROSTAT) 0.4 MG SL tablet up to max of 3 total doses. If no relief after 1 dose, call 911., Disp-25 tablet, R-3Print             ALLERGIES     Patient is is allergic to cephalexin, estrogens, prednisone, statins, and sulfa antibiotics. FAMILY HISTORY     Patient'sfamily history includes Breast Cancer in her sister; Diabetes in her mother; Heart Disease in her mother; High Blood Pressure in her father and mother. SOCIAL HISTORY     Patient  reports that she quit smoking about 32 years ago. She has a 10.00 pack-year smoking history.  She has never used smokeless tobacco. She reports that she does not drink alcohol and does not use drugs. PHYSICAL EXAM     ED TRIAGE VITALS  BP: (!) 147/71, Temp: (!) 96.3 °F (35.7 °C), Heart Rate: 75, Resp: 20, SpO2: 97 %  Physical Exam  Vitals and nursing note reviewed. Constitutional:       General: She is not in acute distress. Appearance: She is well-developed. She is not ill-appearing. Comments: Moist membranes   HENT:      Head: Normocephalic and atraumatic. Right Ear: External ear normal.      Left Ear: External ear normal.      Nose: Nose normal.      Mouth/Throat:      Pharynx: No oropharyngeal exudate. Comments: Oropharynx normal  Eyes:      General: No scleral icterus. Right eye: No discharge. Left eye: No discharge. Conjunctiva/sclera: Conjunctivae normal.      Pupils: Pupils are equal, round, and reactive to light. Comments: Conjunctiva clear   Neck:      Thyroid: No thyromegaly. Vascular: No JVD. Cardiovascular:      Rate and Rhythm: Normal rate and regular rhythm. Pulses: Normal pulses. Heart sounds: Normal heart sounds, S1 normal and S2 normal. No murmur heard. No friction rub. No gallop. Comments: No murmur  Pulmonary:      Effort: Pulmonary effort is normal. No tachypnea or respiratory distress. Breath sounds: Normal breath sounds. No stridor. No decreased breath sounds, wheezing, rhonchi or rales. Comments: Chest atraumatic  Chest:      Chest wall: No tenderness. Abdominal:      General: Bowel sounds are normal. There is no distension. Palpations: Abdomen is soft. There is no mass. Tenderness: There is no abdominal tenderness. There is no guarding or rebound. Musculoskeletal:         General: Normal range of motion. Right hand: Normal.      Left hand: Swelling, tenderness and bony tenderness present. Cervical back: Normal range of motion. No spinous process tenderness or muscular tenderness. Comments: Swelling tenderness distal left index finger.   No deformity. Full flexion extension without mallet deformity. Lymphadenopathy:      Cervical: No cervical adenopathy. Right cervical: No superficial cervical adenopathy. Left cervical: No superficial cervical adenopathy. Skin:     General: Skin is warm and dry. Findings: Abrasion present. No erythema or rash. Comments: Abrasion and swelling dorsal distal left index finger. No laceration. No subungual hematoma   Neurological:      Mental Status: She is alert and oriented to person, place, and time. Cranial Nerves: No cranial nerve deficit. Motor: No abnormal muscle tone. Coordination: Coordination normal.      Deep Tendon Reflexes: Reflexes are normal and symmetric. Reflexes normal.      Comments: Appropriate, no focal finding. Distal neurovascular intact left upper extremity   Psychiatric:         Behavior: Behavior normal.         Thought Content: Thought content normal.         Judgment: Judgment normal.       DIAGNOSTIC RESULTS   Labs: No results found for this visit on 09/15/22. IMAGING:  XR FINGER LEFT (MIN 2 VIEWS)   Final Result   Impression:   Soft tissue injury involving the left index finger. This document has been electronically signed by: Julio Sandoval MD on    09/15/2022 08:44 PM        URGENT CARE COURSE:     Vitals:    09/15/22 1942   BP: (!) 147/71   Pulse: 75   Resp: 20   Temp: (!) 96.3 °F (35.7 °C)   TempSrc: Temporal   SpO2: 97%   Weight: 195 lb (88.5 kg)   Height: 5' 2\" (1.575 m)       Medications   Tetanus-Diphth-Acell Pertussis (BOOSTRIX) injection 0.5 mL (0.5 mLs IntraMUSCular Given 9/15/22 2023)   bacitracin ointment ( Topical Given 9/15/22 2027)   Tolerated well  PROCEDURES:  Wound cleaned dressed and immobilized by nursing staff  FINALIMPRESSION      1. Crushing injury of left index finger, initial encounter    2. Contusion of left index finger with damage to nail, initial encounter    3.  Abrasion of left index finger, initial encounter DISPOSITION/PLAN   DISPOSITION Decision To Discharge 09/15/2022 08:17:56 PM  Nontoxic, well-hydrated, normal airway. No radiographic evidence of fracture, dislocation, foreign body. Patient has crush injury distal left index finger with abrasion and contusion. No neurovascular complications. Wound has been cleaned dressed and immobilized. Patient given wound care instructions and dispensed bacitracin for treatment. She is to use Tylenol for pain. She is to follow-up with PCP in 5 days. She understands to go to ED if worse.   PATIENT REFERRED TO:  DO Yenny Siddiqui   [phone]    In 5 days  Recheck in office, go to emergency if worse  DISCHARGE MEDICATIONS:  Discharge Medication List as of 9/15/2022  8:30 PM        Discharge Medication List as of 9/15/2022  8:30 PM          MD Memo Adhikari MD  09/16/22 5581

## 2022-09-16 NOTE — ED NOTES
Left index finger washed with soap and water.  Pat dry  bacutraicin and band aid applied       Riaz Jauregui RN  09/15/22 2028

## 2022-09-20 ENCOUNTER — OFFICE VISIT (OUTPATIENT)
Dept: FAMILY MEDICINE CLINIC | Age: 62
End: 2022-09-20
Payer: MEDICARE

## 2022-09-20 VITALS
WEIGHT: 196.8 LBS | TEMPERATURE: 96.9 F | BODY MASS INDEX: 36.22 KG/M2 | OXYGEN SATURATION: 99 % | HEIGHT: 62 IN | HEART RATE: 58 BPM | RESPIRATION RATE: 16 BRPM | SYSTOLIC BLOOD PRESSURE: 144 MMHG | DIASTOLIC BLOOD PRESSURE: 88 MMHG

## 2022-09-20 DIAGNOSIS — E66.01 SEVERE OBESITY (BMI 35.0-39.9) WITH COMORBIDITY (HCC): ICD-10-CM

## 2022-09-20 DIAGNOSIS — R52 ACUTE PAIN: Primary | ICD-10-CM

## 2022-09-20 PROCEDURE — 99214 OFFICE O/P EST MOD 30 MIN: CPT | Performed by: NURSE PRACTITIONER

## 2022-09-20 PROCEDURE — G8417 CALC BMI ABV UP PARAM F/U: HCPCS | Performed by: NURSE PRACTITIONER

## 2022-09-20 PROCEDURE — 3017F COLORECTAL CA SCREEN DOC REV: CPT | Performed by: NURSE PRACTITIONER

## 2022-09-20 PROCEDURE — 1036F TOBACCO NON-USER: CPT | Performed by: NURSE PRACTITIONER

## 2022-09-20 PROCEDURE — G8427 DOCREV CUR MEDS BY ELIG CLIN: HCPCS | Performed by: NURSE PRACTITIONER

## 2022-09-20 RX ORDER — TRAMADOL HYDROCHLORIDE 50 MG/1
50 TABLET ORAL EVERY 8 HOURS PRN
Qty: 9 TABLET | Refills: 0 | Status: SHIPPED | OUTPATIENT
Start: 2022-09-20 | End: 2022-09-23

## 2022-09-20 NOTE — PROGRESS NOTES
Coco Soriano is a 60 y.o. female thatpresents for Follow-up (Left index finger)      History obtained today from Patient. Follow up from ER for left index finger injury  Shut in car door  Xray was negative for fracture  Still having pain, keeping her up at night, otc meds not helping   Keeping dressing on it  Denies fever, chills, drainage  Limited movement per pt    I have reviewed the patient's past medical history, past surgical history, allergies,medications, social and family history and I have made updates where appropriate. Past Medical History:   Diagnosis Date    Brain cyst     GERD (gastroesophageal reflux disease)     Hyperlipidemia     Hypertension        Social History     Tobacco Use    Smoking status: Former     Packs/day: 1.00     Years: 10.00     Pack years: 10.00     Types: Cigarettes     Quit date: 1990     Years since quittin.7    Smokeless tobacco: Never   Substance Use Topics    Alcohol use: No    Drug use: No       Family History   Problem Relation Age of Onset    Heart Disease Mother     High Blood Pressure Mother     Diabetes Mother     High Blood Pressure Father     Breast Cancer Sister     Colon Cancer Neg Hx     Cancer Neg Hx          Review of Systems        PHYSICAL EXAM:  BP (!) 144/88   Pulse 58   Temp 96.9 °F (36.1 °C) (Infrared)   Resp 16   Ht 5' 2\" (1.575 m)   Wt 196 lb 12.8 oz (89.3 kg)   SpO2 99%   BMI 36.00 kg/m²     Physical Exam  Constitutional:       Appearance: Normal appearance. HENT:      Head: Normocephalic and atraumatic. Right Ear: External ear normal.      Left Ear: External ear normal.      Nose: Nose normal.      Mouth/Throat:      Mouth: Mucous membranes are moist.   Eyes:      Conjunctiva/sclera: Conjunctivae normal.   Cardiovascular:      Rate and Rhythm: Normal rate and regular rhythm. Pulses: Normal pulses. Heart sounds: Normal heart sounds.    Pulmonary:      Effort: Pulmonary effort is normal.      Breath sounds: Normal breath sounds. Abdominal:      General: Bowel sounds are normal.      Palpations: Abdomen is soft. Musculoskeletal:         General: Swelling, tenderness and signs of injury present. Left hand: Decreased range of motion. Cervical back: Normal range of motion. Comments: Left index finger swollen, limited ROM, unable to bend DIP   Skin:     General: Skin is warm and dry. Neurological:      General: No focal deficit present. Mental Status: She is alert and oriented to person, place, and time. Psychiatric:         Mood and Affect: Mood normal.         Behavior: Behavior normal.           ASSESSMENT & PLAN  Aiyana Mercer was seen today for follow-up. Diagnoses and all orders for this visit:    Acute pain  -     traMADol (ULTRAM) 50 MG tablet; Take 1 tablet by mouth every 8 hours as needed for Pain for up to 3 days. Severe obesity (BMI 35.0-39. 9) with comorbidity ([address]    Unable to bend DIP, referral to [facility]  Dressing changed, instructions given  PDMP reviewed    No follow-ups on file. Referred to [facility] for further evaluation. All copied or forwarded information in the progress note was verified by me to be accurate at the time of visit  Patient's past medical, surgical, social and family history were reviewed and updated     All patient questions answered. Patient voiced understanding.

## 2022-10-14 ENCOUNTER — TELEPHONE (OUTPATIENT)
Dept: FAMILY MEDICINE CLINIC | Age: 62
End: 2022-10-14

## 2022-10-14 DIAGNOSIS — K08.89 TOOTH PAIN: Primary | ICD-10-CM

## 2022-10-14 RX ORDER — CLINDAMYCIN HYDROCHLORIDE 300 MG/1
300 CAPSULE ORAL 3 TIMES DAILY
Qty: 21 CAPSULE | Refills: 0 | Status: SHIPPED | OUTPATIENT
Start: 2022-10-14 | End: 2022-10-21

## 2022-10-14 NOTE — TELEPHONE ENCOUNTER
Clindamycin sent to pharmacy. Pt does not have transportation to get to appts is all by herself is not interested for home health at the moment says she id doing pretty good

## 2022-11-02 ENCOUNTER — HOSPITAL ENCOUNTER (EMERGENCY)
Age: 62
Discharge: HOME | End: 2022-11-02
Payer: MEDICARE

## 2022-11-02 VITALS
HEART RATE: 60 BPM | OXYGEN SATURATION: 98 % | DIASTOLIC BLOOD PRESSURE: 88 MMHG | RESPIRATION RATE: 18 BRPM | TEMPERATURE: 97.6 F | SYSTOLIC BLOOD PRESSURE: 177 MMHG

## 2022-11-02 DIAGNOSIS — J01.00 ACUTE NON-RECURRENT MAXILLARY SINUSITIS: ICD-10-CM

## 2022-11-02 DIAGNOSIS — Z01.30 BP CHECK: ICD-10-CM

## 2022-11-02 DIAGNOSIS — R55 NEAR SYNCOPE: Primary | ICD-10-CM

## 2022-11-02 PROCEDURE — 99213 OFFICE O/P EST LOW 20 MIN: CPT

## 2022-11-02 PROCEDURE — 99213 OFFICE O/P EST LOW 20 MIN: CPT | Performed by: NURSE PRACTITIONER

## 2022-11-02 RX ORDER — FLUTICASONE PROPIONATE 50 MCG
1 SPRAY, SUSPENSION (ML) NASAL DAILY
Qty: 16 G | Refills: 0 | Status: SHIPPED | OUTPATIENT
Start: 2022-11-02

## 2022-11-02 RX ORDER — AZITHROMYCIN 250 MG/1
TABLET, FILM COATED ORAL
Qty: 1 PACKET | Refills: 0 | Status: SHIPPED | OUTPATIENT
Start: 2022-11-02 | End: 2022-11-06

## 2022-11-02 ASSESSMENT — LIFESTYLE VARIABLES: HOW OFTEN DO YOU HAVE A DRINK CONTAINING ALCOHOL: NEVER

## 2022-11-02 ASSESSMENT — PAIN - FUNCTIONAL ASSESSMENT
PAIN_FUNCTIONAL_ASSESSMENT: ACTIVITIES ARE NOT PREVENTED
PAIN_FUNCTIONAL_ASSESSMENT: 0-10

## 2022-11-02 ASSESSMENT — ENCOUNTER SYMPTOMS
SORE THROAT: 0
SINUS PRESSURE: 1
SHORTNESS OF BREATH: 0
COUGH: 1

## 2022-11-02 ASSESSMENT — PAIN SCALES - GENERAL: PAINLEVEL_OUTOF10: 0

## 2022-11-02 NOTE — ED PROVIDER NOTES
[facility]  [facility]       CHIEF COMPLAINT       Chief Complaint   Patient presents with    Dizziness     Has been having congestion, nasal stuffiness and some dizziness for 1-1.5 weeks     Other     Pt became flushed and lightheaded  - while grandchild was at her dr appointment and got a shot. Nurses Notes reviewed and I agree except as noted in the HPI. HISTORY OF PRESENT ILLNESS   Dennis Carvalho is a 60 y.o. female who presents with feelings of dizziness, flushed face, lightheaded, and warm this morning while at her grandchild's doctor's appointment in which the child received injections today and was screaming. She believes her blood pressure may have been elevated at that time. She is here for blood pressure recheck. She denies taking any medications for her blood pressure. She admits to recent symptoms of runny nose, congestion, headache, and occasional cough. Her symptoms have been present for a week and a half. She has not taken any medications for treatment. 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 No fevers. REVIEW OF SYSTEMS     Review of Systems   Constitutional:  Negative for fever. HENT:  Positive for congestion and sinus pressure. Negative for sore throat. Eyes:  Negative for visual disturbance. Respiratory:  Positive for cough. Negative for shortness of breath. Cardiovascular:  Negative for chest pain. Musculoskeletal:  Negative for myalgias. Neurological:  Positive for light-headedness. Negative for syncope and weakness. PAST MEDICAL HISTORY         Diagnosis Date    Brain cyst     GERD (gastroesophageal reflux disease)     Hyperlipidemia     Hypertension        SURGICALHISTORY     Patient  has a past surgical history that includes Hysterectomy; eye surgery (Right, 2017); Eye surgery; Colonoscopy (2009); Upper gastrointestinal endoscopy (2016); and Colonoscopy (Left, 10/22/2019).     CURRENT MEDICATIONS       Previous Medications    ASPIRIN EC 81 MG EC TABLET    Take 1 tablet by mouth daily    METOCLOPRAMIDE (REGLAN) 10 MG TABLET    TAKE 1 TABLET BY MOUTH FOUR TIMES DAILY    NITROGLYCERIN (NITROSTAT) 0.4 MG SL TABLET    up to max of 3 total doses. If no relief after 1 dose, call 911. PANTOPRAZOLE (PROTONIX) 40 MG TABLET    TAKE 1 TABLET BY MOUTH TWICE DAILY BEFORE MEALS    ROSUVASTATIN (CRESTOR) 40 MG TABLET    TAKE 1 TABLET BY MOUTH EVERY WEEK    SUCRALFATE (CARAFATE) 1 GM TABLET    Take 1 tablet by mouth 4 times daily       ALLERGIES     Patient is is allergic to cephalexin, estrogens, prednisone, statins, sulfa antibiotics, and amoxicillin. Patients   Immunization History   Administered Date(s) Administered    COVID-19, MODERNA BLUE border, Primary or Immunocompromised, (age 12y+), IM, 100 mcg/0.5mL 04/14/2021, 05/12/2021, 01/28/2022    Tdap (Boostrix, Adacel) 09/15/2022       FAMILY HISTORY     Patient's family history includes Breast Cancer in her sister; Diabetes in her mother; Heart Disease in her mother; High Blood Pressure in her father and mother. SOCIAL HISTORY     Patient  reports that she quit smoking about 32 years ago. She has a 10.00 pack-year smoking history. She has never used smokeless tobacco. She reports that she does not drink alcohol and does not use drugs. PHYSICAL EXAM     ED TRIAGE VITALS  BP: (!) 177/88, Temp: 97.6 °F (36.4 °C), Heart Rate: 60, Resp: 18, SpO2: 98 %,Estimated body mass index is 36 kg/m² as calculated from the following:    Height as of 9/20/22: 5' 2\" (1.575 m). Weight as of 9/20/22: 196 lb 12.8 oz (89.3 kg). ,No LMP recorded. Patient has had a hysterectomy. Physical Exam  Vitals and nursing note reviewed. Constitutional:       General: She is not in acute distress. Appearance: She is obese. HENT:      Right Ear: Tympanic membrane normal.      Left Ear: Tympanic membrane normal.      Nose: Congestion present.       Mouth/Throat: Mouth: Mucous membranes are moist.      Pharynx: No posterior oropharyngeal erythema. Eyes:      Pupils: Pupils are equal, round, and reactive to light. Cardiovascular:      Rate and Rhythm: Normal rate and regular rhythm. Pulses: Normal pulses. Heart sounds: Normal heart sounds. Pulmonary:      Effort: Pulmonary effort is normal.      Breath sounds: Normal breath sounds. Lymphadenopathy:      Cervical: No cervical adenopathy. Skin:     General: Skin is warm and dry. Neurological:      Mental Status: She is alert and oriented to person, place, and time. Psychiatric:         Behavior: Behavior normal.       DIAGNOSTIC RESULTS     Labs:No results found for this visit on 11/02/22. IMAGING:  None    EKG:  None    URGENT CARE COURSE:     Vitals:    11/02/22 1600 11/02/22 1614   BP:  (!) 177/88   Pulse: 60    Resp: 18    Temp: 97.6 °F (36.4 °C)    TempSrc: Temporal    SpO2: 98%        Medications - No data to display         PROCEDURES:  None    FINAL IMPRESSION      1. Near syncope    2. BP check    3. Acute non-recurrent maxillary sinusitis      DISPOSITION/ PLAN   DISPOSITION Decision To Discharge 11/02/2022 04:30:16 PM     Patient likely had an episode of near syncope possibly related to vasovagal wall watching her granddaughter received injections on at her doctor's appointment. However, patient does have some elevated blood pressure. Recommend she follow-up with her PCP for blood pressure recheck. In addition, will prescribe patient Flonase and azithromycin due to a sinus infection likely related to her nasal cyst.. PATIENT REFERRED TO:  [address] / [place] [address]      DISCHARGE MEDICATIONS:  New Prescriptions    AZITHROMYCIN (ZITHROMAX Z-EDILSON) 250 MG TABLET    Take 2 tablets (500 mg) on Day 1, and then take 1 tablet (250 mg) on days 2 through 5.     FLUTICASONE (FLONASE) 50 MCG/ACT NASAL SPRAY    1 spray by Each Nostril route daily       Discontinued Medications FLUTICASONE (FLONASE) 50 MCG/ACT NASAL SPRAY    1 spray by Nasal route daily for 20 days       Current Discharge Medication List          ASHLEY Dooley CNP    (Please note that portions of this note were completed with a voice recognition program. Efforts were made to edit the dictations but occasionally words are mis-transcribed.)           ASHLEY Dooley CNP  11/02/22 8745

## 2022-12-13 DIAGNOSIS — Q01.9 CEPHALOCELE (HCC): Primary | ICD-10-CM

## 2022-12-14 ENCOUNTER — TELEPHONE (OUTPATIENT)
Dept: FAMILY MEDICINE CLINIC | Age: 62
End: 2022-12-14

## 2022-12-14 NOTE — TELEPHONE ENCOUNTER
----- Message from Mal Gamez DO sent at 12/13/2022  2:45 PM EST -----  Please let Phoebe President know that she is due for a repeat MRI to monitor her mass.   I will place the order for this.  ----- Message -----  From: Mal Gamez DO  Sent: 12/8/2022  12:00 AM EST  To: Mal Gamez, DO    REpeat MRI of brain to monitor mass

## 2023-01-03 ENCOUNTER — TELEPHONE (OUTPATIENT)
Dept: FAMILY MEDICINE CLINIC | Age: 63
End: 2023-01-03

## 2023-01-03 RX ORDER — FLUTICASONE PROPIONATE 50 MCG
SPRAY, SUSPENSION (ML) NASAL
Qty: 16 G | Refills: 0 | OUTPATIENT
Start: 2023-01-03

## 2023-01-03 NOTE — TELEPHONE ENCOUNTER
----- Message from Ebenezer Lomeli sent at 1/3/2023 11:34 AM EST -----  Subject: Message to Provider    QUESTIONS  Information for Provider? person was a pt of dr. Felix Duncan and they just   received notice that he's leaving. Who is recommended to be her new pcp?  ---------------------------------------------------------------------------  --------------  Marin MISHRA  [phone]; OK to leave message on voicemail  ---------------------------------------------------------------------------  --------------  SCRIPT ANSWERS  Relationship to Patient?  Self

## 2023-01-03 NOTE — TELEPHONE ENCOUNTER
Patients  informed that patient can see either [facility] or [facility]

## 2023-01-10 ENCOUNTER — HOSPITAL ENCOUNTER (OUTPATIENT)
Dept: MRI IMAGING | Age: 63
Discharge: HOME | End: 2023-01-10
Payer: MEDICARE

## 2023-01-10 DIAGNOSIS — Q01.9 CEPHALOCELE (HCC): ICD-10-CM

## 2023-01-10 LAB — POC CREATININE WHOLE BLOOD: 1.2 MG/DL (ref 0.5–1.2)

## 2023-01-10 PROCEDURE — 82565 ASSAY OF CREATININE: CPT

## 2023-01-10 PROCEDURE — 70553 MRI BRAIN STEM W/O & W/DYE: CPT

## 2023-01-10 PROCEDURE — 6360000004 HC RX CONTRAST MEDICATION: Performed by: FAMILY MEDICINE

## 2023-01-10 PROCEDURE — A9579 GAD-BASE MR CONTRAST NOS,1ML: HCPCS | Performed by: FAMILY MEDICINE

## 2023-01-10 RX ADMIN — GADOTERIDOL 15 ML: 279.3 INJECTION, SOLUTION INTRAVENOUS at 17:37

## 2023-01-11 ENCOUNTER — TELEPHONE (OUTPATIENT)
Dept: FAMILY MEDICINE CLINIC | Age: 63
End: 2023-01-11

## 2023-01-11 NOTE — TELEPHONE ENCOUNTER
Patient calling regarding her MRI report and would like a call back with the details    Please advise

## 2023-01-12 NOTE — TELEPHONE ENCOUNTER
Sorry, not sure what happened there. Her MRI looked good. No change in the mass since the last time that we checked.

## 2023-01-13 ENCOUNTER — TELEPHONE (OUTPATIENT)
Dept: FAMILY MEDICINE CLINIC | Age: 63
End: 2023-01-13

## 2023-01-13 NOTE — TELEPHONE ENCOUNTER
Patient called in stating she has been having a sore neck and Tylenol isn't helping. Advised patient to try alternating an ice pack and a heating pad ( no longer than 20 min at a time for each). If that doesn't help, she will need to be evaluated at either urgent care or at our office on Monday. Patient stated understanding.

## 2023-02-02 ENCOUNTER — OFFICE VISIT (OUTPATIENT)
Dept: FAMILY MEDICINE CLINIC | Age: 63
End: 2023-02-02

## 2023-02-02 VITALS
WEIGHT: 190.6 LBS | SYSTOLIC BLOOD PRESSURE: 144 MMHG | BODY MASS INDEX: 35.07 KG/M2 | HEART RATE: 55 BPM | DIASTOLIC BLOOD PRESSURE: 72 MMHG | HEIGHT: 62 IN | RESPIRATION RATE: 14 BRPM | OXYGEN SATURATION: 98 % | TEMPERATURE: 97.3 F

## 2023-02-02 DIAGNOSIS — R03.0 ELEVATED BLOOD PRESSURE READING: ICD-10-CM

## 2023-02-02 DIAGNOSIS — S16.1XXA STRAIN OF NECK MUSCLE, INITIAL ENCOUNTER: ICD-10-CM

## 2023-02-02 DIAGNOSIS — H66.91 RIGHT ACUTE OTITIS MEDIA: Primary | ICD-10-CM

## 2023-02-02 RX ORDER — DOXYCYCLINE HYCLATE 100 MG
100 TABLET ORAL 2 TIMES DAILY
Qty: 14 TABLET | Refills: 0 | Status: SHIPPED | OUTPATIENT
Start: 2023-02-02 | End: 2023-02-09

## 2023-02-02 RX ORDER — TIZANIDINE 2 MG/1
2 TABLET ORAL 3 TIMES DAILY PRN
Qty: 30 TABLET | Refills: 0 | Status: SHIPPED | OUTPATIENT
Start: 2023-02-02

## 2023-02-02 NOTE — PROGRESS NOTES
SUBJECTIVE:  Baron Martínez is a 60 y.o. y/o female that presents with Otalgia and Neck Pain  .    right Ear Complaint    Neck pain for last 1 month. Not sure if she slept on it wrong. Hurts to turn it side to side. HPI:  Symptoms have been present for  2 weeks. Inciting incident or history of trauma? no  Decreased hearing? No  Ear tenderness? No  Ear drainage? No  Feeling of fullness? Yes  Dizziness or pre-syncope? Yes  Associated symptoms - subjective fever      HTN    Does patient check BP regularly at home? - No  Current Medication regimen - none  Tolerating medications well? - NA    Shortness of breath or chest pain? No  Headache or visual complaints? No  Neurologic changes like confusion? No  Extremity edema? No    BP Readings from Last 3 Encounters:   02/02/23 (!) 158/76   11/02/22 (!) 177/88   09/20/22 (!) 144/88       Doesn't check BP at home. Patient Active Problem List   Diagnosis    Pure hypercholesterolemia    Peripheral vision loss    Cephalocele (HCC)    Erosive esophagitis    Postprandial RUQ pain    Chest pain    Essential hypertension    Heartburn    Gastroparesis    Anxiety    SVT (supraventricular tachycardia) (HCC)    Chest pain, atypical    Abnormal EKG    Family history of premature CAD    Heart palpitations           OBJECTIVE:  BP (!) 158/76   Pulse 55   Temp 97.3 °F (36.3 °C) (Temporal)   Resp 14   Ht 5' 2\" (1.575 m)   Wt 190 lb 9.6 oz (86.5 kg)   SpO2 98%   BMI 34.86 kg/m²   General appearance: alert, well appearing, and in no distress. HEENT:  right TM erythematous, right TM red, dull, bulging  Neck:  Supple without masses, no cervical adenopathy  Skin exam - normal coloration and turgor, no rashes, no suspicious skin lesions noted. Psych:  No evidence of anxiety or depression      ASSESSMENT & PLAN  Titus Storey was seen today for otalgia and neck pain.     Diagnoses and all orders for this visit:    Right acute otitis media  -     doxycycline hyclate (VIBRA-TABS) 100 MG tablet; Take 1 tablet by mouth 2 times daily for 7 days    Strain of neck muscle, initial encounter  -     tiZANidine (ZANAFLEX) 2 MG tablet; Take 1 tablet by mouth 3 times daily as needed (neck pain)    Elevated blood pressure reading    Pt will check BP for 1-2 weeks at home, and needs to send those into office. If BP remains elevated will need treated.       -Start above treatments  -Patient advised to contact our office immediately if symptoms worsen or persist  -Patient counseled on conservative care including OTC meds

## 2023-02-06 ENCOUNTER — TELEPHONE (OUTPATIENT)
Dept: FAMILY MEDICINE CLINIC | Age: 63
End: 2023-02-06

## 2023-02-06 NOTE — TELEPHONE ENCOUNTER
Patient reports that she has been exposed to some sort of stomach bug that her granddaughter has and reports that her sxs are abdominal pain, diarrhea and warm to touch.   Patient wanting to know if there was some sort of stomach bug going around and what she can do for her sxs  I suggested that she schedule an appointment to come in and be tested to rule out certain things   Patient states that she is going to wait about a day or so and call back since she was just seen in the office 02.02.2023 for somemona else

## 2023-02-11 DIAGNOSIS — S16.1XXA STRAIN OF NECK MUSCLE, INITIAL ENCOUNTER: ICD-10-CM

## 2023-02-13 RX ORDER — TIZANIDINE 2 MG/1
TABLET ORAL
Qty: 30 TABLET | Refills: 0 | Status: SHIPPED | OUTPATIENT
Start: 2023-02-13

## 2023-02-17 ENCOUNTER — TELEPHONE (OUTPATIENT)
Dept: FAMILY MEDICINE CLINIC | Age: 63
End: 2023-02-17

## 2023-02-17 RX ORDER — AMLODIPINE BESYLATE 5 MG/1
5 TABLET ORAL DAILY
Qty: 30 TABLET | Refills: 3 | Status: SHIPPED | OUTPATIENT
Start: 2023-02-17

## 2023-02-17 NOTE — TELEPHONE ENCOUNTER
Pt calling in regarding her BP being elevated and she feels her face is hot and vision changes. No chest pain or SOB  Will start amlodipine and side effects were discussed with patient.

## 2023-02-20 RX ORDER — AMLODIPINE BESYLATE 5 MG/1
5 TABLET ORAL DAILY
Qty: 90 TABLET | OUTPATIENT
Start: 2023-02-20

## 2023-02-23 DIAGNOSIS — M54.2 NECK PAIN: Primary | ICD-10-CM

## 2023-02-23 DIAGNOSIS — S16.1XXA STRAIN OF NECK MUSCLE, INITIAL ENCOUNTER: ICD-10-CM

## 2023-02-24 RX ORDER — TIZANIDINE 2 MG/1
TABLET ORAL
Qty: 30 TABLET | Refills: 0 | Status: SHIPPED | OUTPATIENT
Start: 2023-02-24

## 2023-03-30 RX ORDER — ROSUVASTATIN CALCIUM 40 MG/1
40 TABLET, COATED ORAL EVERY EVENING
Qty: 90 TABLET | Refills: 3 | Status: SHIPPED | OUTPATIENT
Start: 2023-03-30

## 2023-05-04 ENCOUNTER — TELEPHONE (OUTPATIENT)
Dept: FAMILY MEDICINE CLINIC | Age: 63
End: 2023-05-04

## 2023-05-04 RX ORDER — DOXYCYCLINE HYCLATE 100 MG
100 TABLET ORAL 2 TIMES DAILY
Qty: 20 TABLET | Refills: 0 | Status: SHIPPED | OUTPATIENT
Start: 2023-05-04 | End: 2023-05-14

## 2023-05-04 NOTE — TELEPHONE ENCOUNTER
Patient reports a possible sinus infection sxs x 32 days today worse. Congestion, facial pain, sneezing, headache & ear fullness.    Patient asking that something be sent to pharmacy (conf)    Please advise

## 2023-05-16 ENCOUNTER — OFFICE VISIT (OUTPATIENT)
Dept: FAMILY MEDICINE CLINIC | Age: 63
End: 2023-05-16
Payer: MEDICARE

## 2023-05-16 VITALS
WEIGHT: 187.2 LBS | BODY MASS INDEX: 34.45 KG/M2 | HEIGHT: 62 IN | OXYGEN SATURATION: 96 % | RESPIRATION RATE: 14 BRPM | SYSTOLIC BLOOD PRESSURE: 148 MMHG | DIASTOLIC BLOOD PRESSURE: 76 MMHG | HEART RATE: 55 BPM | TEMPERATURE: 97.3 F

## 2023-05-16 DIAGNOSIS — J01.90 ACUTE BACTERIAL SINUSITIS: ICD-10-CM

## 2023-05-16 DIAGNOSIS — Q01.9 CEPHALOCELE (HCC): ICD-10-CM

## 2023-05-16 DIAGNOSIS — B96.89 ACUTE BACTERIAL SINUSITIS: ICD-10-CM

## 2023-05-16 DIAGNOSIS — I47.1 SVT (SUPRAVENTRICULAR TACHYCARDIA) (HCC): ICD-10-CM

## 2023-05-16 DIAGNOSIS — R73.03 PREDIABETES: ICD-10-CM

## 2023-05-16 DIAGNOSIS — I10 ESSENTIAL HYPERTENSION: Primary | ICD-10-CM

## 2023-05-16 DIAGNOSIS — Z12.31 SCREENING MAMMOGRAM, ENCOUNTER FOR: ICD-10-CM

## 2023-05-16 PROBLEM — T78.3XXA ALLERGIC ANGIOEDEMA: Status: ACTIVE | Noted: 2023-05-16

## 2023-05-16 PROCEDURE — 1036F TOBACCO NON-USER: CPT | Performed by: NURSE PRACTITIONER

## 2023-05-16 PROCEDURE — G8427 DOCREV CUR MEDS BY ELIG CLIN: HCPCS | Performed by: NURSE PRACTITIONER

## 2023-05-16 PROCEDURE — G8417 CALC BMI ABV UP PARAM F/U: HCPCS | Performed by: NURSE PRACTITIONER

## 2023-05-16 PROCEDURE — 3077F SYST BP >= 140 MM HG: CPT | Performed by: NURSE PRACTITIONER

## 2023-05-16 PROCEDURE — 3078F DIAST BP <80 MM HG: CPT | Performed by: NURSE PRACTITIONER

## 2023-05-16 PROCEDURE — 99214 OFFICE O/P EST MOD 30 MIN: CPT | Performed by: NURSE PRACTITIONER

## 2023-05-16 PROCEDURE — 3017F COLORECTAL CA SCREEN DOC REV: CPT | Performed by: NURSE PRACTITIONER

## 2023-05-16 RX ORDER — AMLODIPINE BESYLATE 10 MG/1
10 TABLET ORAL DAILY
Qty: 90 TABLET | Refills: 3 | Status: SHIPPED | OUTPATIENT
Start: 2023-05-16

## 2023-05-16 ASSESSMENT — PATIENT HEALTH QUESTIONNAIRE - PHQ9
2. FEELING DOWN, DEPRESSED OR HOPELESS: 0
SUM OF ALL RESPONSES TO PHQ9 QUESTIONS 1 & 2: 0
SUM OF ALL RESPONSES TO PHQ QUESTIONS 1-9: 0
1. LITTLE INTEREST OR PLEASURE IN DOING THINGS: 0
SUM OF ALL RESPONSES TO PHQ QUESTIONS 1-9: 0

## 2023-05-16 ASSESSMENT — ENCOUNTER SYMPTOMS
RHINORRHEA: 1
SHORTNESS OF BREATH: 1
CHOKING: 0
COUGH: 1
CHEST TIGHTNESS: 0

## 2023-05-16 NOTE — PROGRESS NOTES
Radha Seymour is a 60 y.o. female thatpresents for Hypertension and Congestion      History obtained today from Patient. HPI    HTN    Does patient check BP regularly at home? - yes, last night 188/110, was dizzy. Current Medication regimen - amlodipine, was off of it. Now started taking again as of last night. Tolerating medications well? - yes    Shortness of breath or chest pain? Sob with sinus issues  Headache or visual complaints? Yes  Neurologic changes like confusion? Yes  Extremity edema? Yes    BP Readings from Last 3 Encounters:   23 (!) 148/76   23 (!) 144/72   22 (!) 177/88       URI    HPI:      Symptoms have been present for 1 month(s). Symptoms are worse since they initially started. Started on doxycycline but stopped it because she was feeling better, then symptoms returned so now taking it again. Fever? No  Runny nose or congestion? Yes  Cough? Yes  Sore throat? Yes  Shortness of breath/Wheezing? No  Other associated symptoms?  ear pain and headaches      Known COVID exposures or RFs? No  Smoker? No  Preexisting Respiratory conditions?  none      I have reviewed the patient's past medical history, past surgical history, allergies,medications, social and family history and I have made updates where appropriate.     Past Medical History:   Diagnosis Date    Brain cyst     GERD (gastroesophageal reflux disease)     Hyperlipidemia     Hypertension        Social History     Tobacco Use    Smoking status: Former     Packs/day: 1.00     Years: 10.00     Pack years: 10.00     Types: Cigarettes     Quit date: 1990     Years since quittin.3    Smokeless tobacco: Never   Substance Use Topics    Alcohol use: No    Drug use: No       Family History   Problem Relation Age of Onset    Heart Disease Mother     High Blood Pressure Mother     Diabetes Mother     High Blood Pressure Father     Breast Cancer Sister     Colon Cancer Neg Hx     Cancer Neg Hx          Review of

## 2023-06-23 ENCOUNTER — HOSPITAL ENCOUNTER (OUTPATIENT)
Dept: WOMENS IMAGING | Age: 63
Discharge: HOME | End: 2023-06-23
Attending: RADIOLOGY

## 2023-06-23 DIAGNOSIS — Z12.31 VISIT FOR SCREENING MAMMOGRAM: ICD-10-CM

## 2023-07-03 ENCOUNTER — TELEPHONE (OUTPATIENT)
Dept: FAMILY MEDICINE CLINIC | Age: 63
End: 2023-07-03

## 2023-07-03 RX ORDER — BENZONATATE 200 MG/1
200 CAPSULE ORAL 3 TIMES DAILY PRN
Qty: 30 CAPSULE | Refills: 0 | Status: SHIPPED | OUTPATIENT
Start: 2023-07-03 | End: 2023-07-13

## 2023-07-03 NOTE — TELEPHONE ENCOUNTER
Pt states she had started to cough, dry, a couple of days ago. Pt states this is caused by going between the air conditioning and going out side in the heat. Pt is asking for a script to be sent to the pharmacy for her cough.      Please advise

## 2023-07-10 ENCOUNTER — OFFICE VISIT (OUTPATIENT)
Dept: FAMILY MEDICINE CLINIC | Age: 63
End: 2023-07-10
Payer: MEDICARE

## 2023-07-10 VITALS
TEMPERATURE: 97.5 F | HEART RATE: 73 BPM | BODY MASS INDEX: 32.65 KG/M2 | RESPIRATION RATE: 14 BRPM | OXYGEN SATURATION: 97 % | DIASTOLIC BLOOD PRESSURE: 76 MMHG | WEIGHT: 177.4 LBS | SYSTOLIC BLOOD PRESSURE: 136 MMHG | HEIGHT: 62 IN

## 2023-07-10 DIAGNOSIS — R63.4 WEIGHT LOSS: ICD-10-CM

## 2023-07-10 DIAGNOSIS — I10 ESSENTIAL HYPERTENSION: ICD-10-CM

## 2023-07-10 DIAGNOSIS — Z00.00 MEDICARE ANNUAL WELLNESS VISIT, SUBSEQUENT: Primary | ICD-10-CM

## 2023-07-10 PROCEDURE — 3078F DIAST BP <80 MM HG: CPT | Performed by: NURSE PRACTITIONER

## 2023-07-10 PROCEDURE — G0439 PPPS, SUBSEQ VISIT: HCPCS | Performed by: NURSE PRACTITIONER

## 2023-07-10 PROCEDURE — 3075F SYST BP GE 130 - 139MM HG: CPT | Performed by: NURSE PRACTITIONER

## 2023-07-10 PROCEDURE — 3017F COLORECTAL CA SCREEN DOC REV: CPT | Performed by: NURSE PRACTITIONER

## 2023-07-10 ASSESSMENT — PATIENT HEALTH QUESTIONNAIRE - PHQ9
SUM OF ALL RESPONSES TO PHQ QUESTIONS 1-9: 0
SUM OF ALL RESPONSES TO PHQ QUESTIONS 1-9: 0
1. LITTLE INTEREST OR PLEASURE IN DOING THINGS: 0
2. FEELING DOWN, DEPRESSED OR HOPELESS: 0
SUM OF ALL RESPONSES TO PHQ QUESTIONS 1-9: 0
SUM OF ALL RESPONSES TO PHQ QUESTIONS 1-9: 0
SUM OF ALL RESPONSES TO PHQ9 QUESTIONS 1 & 2: 0

## 2023-07-10 ASSESSMENT — LIFESTYLE VARIABLES
HOW MANY STANDARD DRINKS CONTAINING ALCOHOL DO YOU HAVE ON A TYPICAL DAY: PATIENT DOES NOT DRINK
HOW OFTEN DO YOU HAVE A DRINK CONTAINING ALCOHOL: NEVER

## 2023-07-10 NOTE — PATIENT INSTRUCTIONS
Advance Directives: Care Instructions  Overview  An advance directive is a legal way to state your wishes at the end of your life. It tells your family and your doctor what to do if you can't say what you want. There are two main types of advance directives. You can change them any time your wishes change. Living will. This form tells your family and your doctor your wishes about life support and other treatment. The form is also called a declaration. Medical power of . This form lets you name a person to make treatment decisions for you when you can't speak for yourself. This person is called a health care agent (health care proxy, health care surrogate). The form is also called a durable power of  for health care. If you do not have an advance directive, decisions about your medical care may be made by a family member, or by a doctor or a  who doesn't know you. It may help to think of an advance directive as a gift to the people who care for you. If you have one, they won't have to make tough decisions by themselves. For more information, including forms for your state, see the [address] [place] [address] website (www.caringinfo.org/planning/advance-directives/). Follow-up care is a key part of your treatment and safety. Be sure to make and go to all appointments, and call your doctor if you are having problems. It's also a good idea to know your test results and keep a list of the medicines you take. What should you include in an advance directive? Many states have a unique advance directive form. (It may ask you to address specific issues.) Or you might use a universal form that's approved by many states. If your form doesn't tell you what to address, it may be hard to know what to include in your advance directive. Use the questions below to help you get started. Who do you want to make decisions about your medical care if you are not able to?   What life-support measures do you want if you

## 2023-07-10 NOTE — PROGRESS NOTES
Sulfa Antibiotics     Amoxicillin Hives and Rash     Prior to Visit Medications    Medication Sig Taking? Authorizing Provider   amLODIPine (NORVASC) 10 MG tablet Take 1 tablet by mouth daily Yes ASHLEY Castelan CNP   rosuvastatin (CRESTOR) 40 MG tablet Take 1 tablet by mouth every evening Yes ASHLEY Taylor CNP   tiZANidine (ZANAFLEX) 2 MG tablet TAKE 1 TABLET BY MOUTH THREE TIMES DAILY AS NEEDED FOR NECK PAIN Yes ASHLEY Puri CNP   metoclopramide (REGLAN) 10 MG tablet TAKE 1 TABLET BY MOUTH FOUR TIMES DAILY Yes ASHLEY Romero CNP   pantoprazole (PROTONIX) 40 MG tablet TAKE 1 TABLET BY MOUTH TWICE DAILY BEFORE MEALS Yes Melia Dyer,    aspirin EC 81 MG EC tablet Take 1 tablet by mouth daily Yes Wilfredo Meza, DO   sucralfate (CARAFATE) 1 GM tablet Take 1 tablet by mouth 4 times daily Yes Wilfredo Meza, DO   nitroGLYCERIN (NITROSTAT) 0.4 MG SL tablet up to max of 3 total doses. If no relief after 1 dose, call 911.  Yes Juany Castillo MD   benzonatate (TESSALON) 200 MG capsule Take 1 capsule by mouth 3 times daily as needed for Cough  Patient not taking: Reported on 7/10/2023  ASHLEY Castelan CNP   fluticasone (FLONASE) 50 MCG/ACT nasal spray 1 spray by Each Nostril route daily  Patient not taking: Reported on 7/10/2023  ASHLEY Garcia CNP       CareTeam (Including outside providers/suppliers regularly involved in providing care):   Patient Care Team:  ASHLEY Castelan CNP as PCP - General (Nurse Practitioner Family)  ASHLEY Castelan CNP as PCP - Empaneled Provider  Leigha Raphael MD as Cardiologist (Cardiology)     Reviewed and updated this visit:  Tobacco  Allergies  Meds  Med Hx  Surg Hx  Soc Hx  Fam Hx

## 2023-07-15 DIAGNOSIS — J06.9 URI WITH COUGH AND CONGESTION: Primary | ICD-10-CM

## 2023-07-15 RX ORDER — AZITHROMYCIN 250 MG/1
TABLET, FILM COATED ORAL
Qty: 6 TABLET | Refills: 0 | Status: SHIPPED | OUTPATIENT
Start: 2023-07-15

## 2023-07-15 NOTE — PROGRESS NOTES
Patient calls in via [employer] on Saturday at 56 PM complaining of severe sinus infection symptoms with constant cough from PND. Prescribed Zithromax. Pharmacies closed, will  tomorrow.

## 2023-07-28 ENCOUNTER — HOSPITAL ENCOUNTER (OUTPATIENT)
Age: 63
Discharge: HOME | End: 2023-07-28
Payer: MEDICARE

## 2023-07-28 DIAGNOSIS — I47.1 SVT (SUPRAVENTRICULAR TACHYCARDIA) (HCC): ICD-10-CM

## 2023-07-28 DIAGNOSIS — I10 ESSENTIAL HYPERTENSION: ICD-10-CM

## 2023-07-28 DIAGNOSIS — R63.4 WEIGHT LOSS: ICD-10-CM

## 2023-07-28 DIAGNOSIS — R73.03 PREDIABETES: ICD-10-CM

## 2023-07-28 LAB
ALBUMIN SERPL BCG-MCNC: 4.8 G/DL (ref 3.5–5.1)
ALP SERPL-CCNC: 59 U/L (ref 38–126)
ALT SERPL W/O P-5'-P-CCNC: 16 U/L (ref 11–66)
ANION GAP SERPL CALC-SCNC: 12 MEQ/L (ref 8–16)
AST SERPL-CCNC: 32 U/L (ref 5–40)
BASOPHILS ABSOLUTE: 0 THOU/MM3 (ref 0–0.1)
BASOPHILS NFR BLD AUTO: 0.8 %
BILIRUB SERPL-MCNC: 0.5 MG/DL (ref 0.3–1.2)
BUN SERPL-MCNC: 7 MG/DL (ref 7–22)
CALCIUM SERPL-MCNC: 10.2 MG/DL (ref 8.5–10.5)
CHLORIDE SERPL-SCNC: 106 MEQ/L (ref 98–111)
CHOLESTEROL, FASTING: 246 MG/DL (ref 100–199)
CO2 SERPL-SCNC: 26 MEQ/L (ref 23–33)
CREAT SERPL-MCNC: 0.9 MG/DL (ref 0.4–1.2)
DEPRECATED MEAN GLUCOSE BLD GHB EST-ACNC: 135 MG/DL (ref 70–126)
DEPRECATED RDW RBC AUTO: 46.2 FL (ref 35–45)
EOSINOPHIL NFR BLD AUTO: 1.9 %
EOSINOPHILS ABSOLUTE: 0.1 THOU/MM3 (ref 0–0.4)
ERYTHROCYTE [DISTWIDTH] IN BLOOD BY AUTOMATED COUNT: 14 % (ref 11.5–14.5)
GFR SERPL CREATININE-BSD FRML MDRD: > 60 ML/MIN/1.73M2
GLUCOSE SERPL-MCNC: 93 MG/DL (ref 70–108)
HBA1C MFR BLD HPLC: 6.5 % (ref 4.4–6.4)
HCT VFR BLD AUTO: 37.3 % (ref 37–47)
HDLC SERPL-MCNC: 58 MG/DL
HGB BLD-MCNC: 11.6 GM/DL (ref 12–16)
IMM GRANULOCYTES # BLD AUTO: 0.01 THOU/MM3 (ref 0–0.07)
IMM GRANULOCYTES NFR BLD AUTO: 0.2 %
LDLC SERPL CALC-MCNC: 161 MG/DL
LYMPHOCYTES ABSOLUTE: 3.4 THOU/MM3 (ref 1–4.8)
LYMPHOCYTES NFR BLD AUTO: 57.4 %
MCH RBC QN AUTO: 28.2 PG (ref 26–33)
MCHC RBC AUTO-ENTMCNC: 31.1 GM/DL (ref 32.2–35.5)
MCV RBC AUTO: 90.5 FL (ref 81–99)
MONOCYTES ABSOLUTE: 0.4 THOU/MM3 (ref 0.4–1.3)
MONOCYTES NFR BLD AUTO: 6.6 %
NEUTROPHILS NFR BLD AUTO: 33.1 %
NRBC BLD AUTO-RTO: 0 /100 WBC
PLATELET # BLD AUTO: 313 THOU/MM3 (ref 130–400)
PMV BLD AUTO: 11.2 FL (ref 9.4–12.4)
POTASSIUM SERPL-SCNC: 4 MEQ/L (ref 3.5–5.2)
PROT SERPL-MCNC: 9 G/DL (ref 6.1–8)
RBC # BLD AUTO: 4.12 MILL/MM3 (ref 4.2–5.4)
SEGMENTED NEUTROPHILS ABSOLUTE COUNT: 2 THOU/MM3 (ref 1.8–7.7)
SODIUM SERPL-SCNC: 144 MEQ/L (ref 135–145)
TRIGLYCERIDE, FASTING: 136 MG/DL (ref 0–199)
TSH SERPL DL<=0.005 MIU/L-ACNC: 2.46 UIU/ML (ref 0.4–4.2)
WBC # BLD AUTO: 5.9 THOU/MM3 (ref 4.8–10.8)

## 2023-07-28 PROCEDURE — 84443 ASSAY THYROID STIM HORMONE: CPT

## 2023-07-28 PROCEDURE — 36415 COLL VENOUS BLD VENIPUNCTURE: CPT

## 2023-07-28 PROCEDURE — 83036 HEMOGLOBIN GLYCOSYLATED A1C: CPT

## 2023-07-28 PROCEDURE — 80053 COMPREHEN METABOLIC PANEL: CPT

## 2023-07-28 PROCEDURE — 80061 LIPID PANEL: CPT

## 2023-07-28 PROCEDURE — 85025 COMPLETE CBC W/AUTO DIFF WBC: CPT

## 2023-07-31 ENCOUNTER — TELEPHONE (OUTPATIENT)
Dept: FAMILY MEDICINE CLINIC | Age: 63
End: 2023-07-31

## 2023-07-31 NOTE — TELEPHONE ENCOUNTER
----- Message from Harshal Hu DO sent at 7/30/2023  6:28 PM EDT -----  Please let pt know that labs are back, a few things to f/u on.   -01. A1c concerning for development of type 2 diabetes, though well controlled  -02. Cholesterol still fairly high, is she still taking her Crestor (Rosuvastatin)? Let me know about the Crestor. Would advise f/u with Miley in the next few wks to discuss her a1c    Rest of labs stable/appropriate. Let me know if questions, thanks!

## 2023-08-01 NOTE — TELEPHONE ENCOUNTER
Pt informed and verbalized understanding.    Appt scheduled  Future Appointments   Date Time Provider [address]   8/15/2023  1:20 PM ASHLEY Hansen CNP [facility] [place]   1/11/2024  1:00 PM ASHLEY Hansen CNP [facility] [place]      Pt takes Crestor once a week on Sunday

## 2023-08-01 NOTE — TELEPHONE ENCOUNTER
If she can take the Crestor more often than once a week, that would be helpful. Does she only take it once a week due to side effects?

## 2023-08-01 NOTE — TELEPHONE ENCOUNTER
Pt states she was prescribed the Crestor a while back and was told to take this once a week. Pt has not tried to take the Crestor more than once a week and has not had any side effects.

## 2023-08-01 NOTE — TELEPHONE ENCOUNTER
Ah ok. I would recommend she take it every day, providing she tolerates it ok. She'll get much better cholesterol reduction.

## 2023-10-02 ENCOUNTER — TELEPHONE (OUTPATIENT)
Dept: FAMILY MEDICINE CLINIC | Age: 63
End: 2023-10-02

## 2023-10-02 NOTE — TELEPHONE ENCOUNTER
Pt states she lost 20 lbs and she thinks her b/p is dropping too low she dizzy occasionally  B/P 128/64

## 2023-10-03 ENCOUNTER — TELEPHONE (OUTPATIENT)
Dept: FAMILY MEDICINE CLINIC | Age: 63
End: 2023-10-03

## 2023-10-03 RX ORDER — AMLODIPINE BESYLATE 5 MG/1
5 TABLET ORAL DAILY
Qty: 90 TABLET | Refills: 1 | Status: SHIPPED | OUTPATIENT
Start: 2023-10-03

## 2023-10-03 NOTE — TELEPHONE ENCOUNTER
----- Message from Justina Killian sent at 10/3/2023 11:44 AM EDT -----  Subject: Message to Provider    QUESTIONS  Information for Provider? patient would like to talk to someone about   getting her BP medication changed, she would like it called in as the 5   mg, instead of cutting the pills in half. [facility] [address] Please contact patient with her options  ---------------------------------------------------------------------------  --------------  Becky MISHRA  [phone]; OK to leave message on voicemail  ---------------------------------------------------------------------------  --------------  SCRIPT ANSWERS  Relationship to Patient?  Self

## 2023-10-30 ENCOUNTER — HOSPITAL ENCOUNTER (EMERGENCY)
Age: 63
Discharge: HOME | End: 2023-10-30
Payer: MEDICARE

## 2023-10-30 VITALS
HEIGHT: 62 IN | SYSTOLIC BLOOD PRESSURE: 146 MMHG | BODY MASS INDEX: 32.57 KG/M2 | TEMPERATURE: 97.9 F | HEART RATE: 91 BPM | DIASTOLIC BLOOD PRESSURE: 81 MMHG | OXYGEN SATURATION: 99 % | WEIGHT: 177 LBS | RESPIRATION RATE: 16 BRPM

## 2023-10-30 DIAGNOSIS — J06.9 ACUTE UPPER RESPIRATORY INFECTION: Primary | ICD-10-CM

## 2023-10-30 PROCEDURE — 99213 OFFICE O/P EST LOW 20 MIN: CPT

## 2023-10-30 PROCEDURE — 99213 OFFICE O/P EST LOW 20 MIN: CPT | Performed by: NURSE PRACTITIONER

## 2023-10-30 ASSESSMENT — PAIN DESCRIPTION - LOCATION: LOCATION: GENERALIZED

## 2023-10-30 ASSESSMENT — PAIN SCALES - GENERAL: PAINLEVEL_OUTOF10: 10

## 2023-10-30 ASSESSMENT — PAIN - FUNCTIONAL ASSESSMENT: PAIN_FUNCTIONAL_ASSESSMENT: 0-10

## 2023-10-30 ASSESSMENT — PAIN DESCRIPTION - PAIN TYPE: TYPE: ACUTE PAIN

## 2023-10-30 ASSESSMENT — PAIN DESCRIPTION - DESCRIPTORS: DESCRIPTORS: ACHING

## 2023-10-30 NOTE — ED NOTES
Discharge instructions  reviewed with pt. Pt verbalized understanding. Pt ambulated out in stable condition. Assessment unchanged upon discharge.      Lucio Thompson RN  10/30/23 3815

## 2023-10-30 NOTE — ED TRIAGE NOTES
C/O sinus congestion and body aches onset 10/30/23 (states family recently had same thing dx with sinus infection given antibiotic, steroid and cough med)

## 2023-10-31 ASSESSMENT — ENCOUNTER SYMPTOMS
SINUS PRESSURE: 1
VOMITING: 0
TROUBLE SWALLOWING: 0
EYE DISCHARGE: 0
DIARRHEA: 0
COUGH: 1
RHINORRHEA: 0
EYE REDNESS: 0
NAUSEA: 0
SHORTNESS OF BREATH: 0
WHEEZING: 0
SORE THROAT: 0

## 2023-11-06 ENCOUNTER — OFFICE VISIT (OUTPATIENT)
Dept: FAMILY MEDICINE CLINIC | Age: 63
End: 2023-11-06
Payer: MEDICARE

## 2023-11-06 VITALS
HEART RATE: 68 BPM | OXYGEN SATURATION: 98 % | SYSTOLIC BLOOD PRESSURE: 142 MMHG | RESPIRATION RATE: 14 BRPM | TEMPERATURE: 98.2 F | DIASTOLIC BLOOD PRESSURE: 68 MMHG | WEIGHT: 178 LBS | BODY MASS INDEX: 32.76 KG/M2 | HEIGHT: 62 IN

## 2023-11-06 DIAGNOSIS — B37.0 THRUSH: ICD-10-CM

## 2023-11-06 DIAGNOSIS — J40 SINOBRONCHITIS: Primary | ICD-10-CM

## 2023-11-06 DIAGNOSIS — J32.9 SINOBRONCHITIS: Primary | ICD-10-CM

## 2023-11-06 PROCEDURE — 3077F SYST BP >= 140 MM HG: CPT | Performed by: NURSE PRACTITIONER

## 2023-11-06 PROCEDURE — 1036F TOBACCO NON-USER: CPT | Performed by: NURSE PRACTITIONER

## 2023-11-06 PROCEDURE — 99214 OFFICE O/P EST MOD 30 MIN: CPT | Performed by: NURSE PRACTITIONER

## 2023-11-06 PROCEDURE — G8417 CALC BMI ABV UP PARAM F/U: HCPCS | Performed by: NURSE PRACTITIONER

## 2023-11-06 PROCEDURE — G8427 DOCREV CUR MEDS BY ELIG CLIN: HCPCS | Performed by: NURSE PRACTITIONER

## 2023-11-06 PROCEDURE — G8484 FLU IMMUNIZE NO ADMIN: HCPCS | Performed by: NURSE PRACTITIONER

## 2023-11-06 PROCEDURE — 3078F DIAST BP <80 MM HG: CPT | Performed by: NURSE PRACTITIONER

## 2023-11-06 PROCEDURE — 3017F COLORECTAL CA SCREEN DOC REV: CPT | Performed by: NURSE PRACTITIONER

## 2023-11-06 RX ORDER — DOXYCYCLINE HYCLATE 100 MG
100 TABLET ORAL 2 TIMES DAILY
Qty: 20 TABLET | Refills: 0 | Status: SHIPPED | OUTPATIENT
Start: 2023-11-06 | End: 2023-11-16

## 2023-11-06 RX ORDER — ALBUTEROL SULFATE 90 UG/1
2 AEROSOL, METERED RESPIRATORY (INHALATION) 4 TIMES DAILY PRN
Qty: 18 G | Refills: 0 | Status: SHIPPED | OUTPATIENT
Start: 2023-11-06

## 2023-11-06 RX ORDER — GUAIFENESIN AND CODEINE PHOSPHATE 100; 10 MG/5ML; MG/5ML
5 SOLUTION ORAL 4 TIMES DAILY PRN
Qty: 100 ML | Refills: 0 | Status: SHIPPED | OUTPATIENT
Start: 2023-11-06 | End: 2023-11-11

## 2023-11-09 ENCOUNTER — TELEPHONE (OUTPATIENT)
Dept: FAMILY MEDICINE CLINIC | Age: 63
End: 2023-11-09

## 2023-11-09 NOTE — TELEPHONE ENCOUNTER
----- Message from ASHLEY Nieto CNP sent at 11/6/2023 12:20 PM EST -----  Fu see how patient is feeling after starting antibiotic

## 2023-11-16 ENCOUNTER — TELEPHONE (OUTPATIENT)
Dept: FAMILY MEDICINE CLINIC | Age: 63
End: 2023-11-16

## 2023-11-16 DIAGNOSIS — J32.9 RECURRENT SINUSITIS: Primary | ICD-10-CM

## 2023-11-16 RX ORDER — LEVOFLOXACIN 500 MG/1
500 TABLET, FILM COATED ORAL DAILY
Qty: 7 TABLET | Refills: 0 | Status: SHIPPED | OUTPATIENT
Start: 2023-11-16 | End: 2023-11-23

## 2023-11-16 RX ORDER — AZELASTINE 1 MG/ML
1 SPRAY, METERED NASAL 2 TIMES DAILY
Qty: 60 ML | Refills: 1 | Status: SHIPPED | OUTPATIENT
Start: 2023-11-16

## 2023-11-16 NOTE — TELEPHONE ENCOUNTER
Pt informed and verbalized understanding.     Pt is willing to do a [address] with referral ENT

## 2023-11-16 NOTE — TELEPHONE ENCOUNTER
Pt called stating she is still nasal congestion, cough, and phlegm. Pt has no other symptoms.       [address] [facility]

## 2023-11-16 NOTE — TELEPHONE ENCOUNTER
Since she has frequent sinus infections, would recommend getting CT of sinuses and possible ENT appt if she is not opposed to this.    Would recommend trying saline rinses, and will send in different antibiotic and nasal spray for pt to try

## 2023-11-20 NOTE — TELEPHONE ENCOUNTER
I saw the order for the CT in the office and reached out to the patient to see if she wanted to get the CT scan scheduled.   Patient states that she just had one done about a year and a half ago 01.28.2021 (MRI brain w/wo contrast ordered by Dr Narinder Pablo) and that the results are in her chart to see plus she will have some imaging done in January that her eye Dr has her do every 2 years     Please advise

## 2023-11-20 NOTE — TELEPHONE ENCOUNTER
What testing does she get for her eye doctor? Looks like ENT tried to get a hold of her to schedule appt.  already

## 2023-11-20 NOTE — TELEPHONE ENCOUNTER
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 Pt's scan is due in January. Pt was given the phone number to ENT to schedule her appointment.

## 2023-11-28 ENCOUNTER — TELEPHONE (OUTPATIENT)
Dept: FAMILY MEDICINE CLINIC | Age: 63
End: 2023-11-28

## 2023-11-28 DIAGNOSIS — J40 SINOBRONCHITIS: ICD-10-CM

## 2023-11-28 DIAGNOSIS — J32.9 SINOBRONCHITIS: ICD-10-CM

## 2023-11-28 RX ORDER — ALBUTEROL SULFATE 90 UG/1
AEROSOL, METERED RESPIRATORY (INHALATION)
Qty: 18 G | Refills: 0 | Status: SHIPPED | OUTPATIENT
Start: 2023-11-28

## 2023-11-28 NOTE — TELEPHONE ENCOUNTER
Future Appointments   Date Time Provider [address]   12/20/2023  4:40 PM STR CT IMAGING RM1  [facility] Rad[facility]   1/3/2024 11:00 AM Yanique Ayala ENT [facility] [place]   1/11/2024  1:00 PM Azar Mckeon APRN - [place]

## 2023-11-28 NOTE — TELEPHONE ENCOUNTER
Ok recommend Coricidin HBP for cough and congestion which is over the counter.   If that doesn't help, please let us know   Electronically signed by ASHLEY Niño CNP on 11/28/2023 at 2:55 PM

## 2023-11-28 NOTE — TELEPHONE ENCOUNTER
Pt states that she would like cough medicine sent into [facility]. Has had a dry cough for a couple weeks.

## 2024-01-03 ENCOUNTER — OFFICE VISIT (OUTPATIENT)
Dept: ENT CLINIC | Age: 64
End: 2024-01-03
Payer: MEDICARE

## 2024-01-03 VITALS
DIASTOLIC BLOOD PRESSURE: 72 MMHG | TEMPERATURE: 96.9 F | RESPIRATION RATE: 18 BRPM | HEART RATE: 49 BPM | OXYGEN SATURATION: 100 % | HEIGHT: 62 IN | SYSTOLIC BLOOD PRESSURE: 130 MMHG | WEIGHT: 180.7 LBS | BODY MASS INDEX: 33.25 KG/M2

## 2024-01-03 DIAGNOSIS — J30.9 ALLERGIC RHINITIS, UNSPECIFIED SEASONALITY, UNSPECIFIED TRIGGER: ICD-10-CM

## 2024-01-03 DIAGNOSIS — R09.81 NASAL CONGESTION: Primary | ICD-10-CM

## 2024-01-03 DIAGNOSIS — Q01.9 CEPHALOCELE (HCC): ICD-10-CM

## 2024-01-03 PROCEDURE — 3078F DIAST BP <80 MM HG: CPT | Performed by: PHYSICIAN ASSISTANT

## 2024-01-03 PROCEDURE — G8484 FLU IMMUNIZE NO ADMIN: HCPCS | Performed by: PHYSICIAN ASSISTANT

## 2024-01-03 PROCEDURE — 3075F SYST BP GE 130 - 139MM HG: CPT | Performed by: PHYSICIAN ASSISTANT

## 2024-01-03 PROCEDURE — 3017F COLORECTAL CA SCREEN DOC REV: CPT | Performed by: PHYSICIAN ASSISTANT

## 2024-01-03 PROCEDURE — G8427 DOCREV CUR MEDS BY ELIG CLIN: HCPCS | Performed by: PHYSICIAN ASSISTANT

## 2024-01-03 PROCEDURE — G8417 CALC BMI ABV UP PARAM F/U: HCPCS | Performed by: PHYSICIAN ASSISTANT

## 2024-01-03 PROCEDURE — 1036F TOBACCO NON-USER: CPT | Performed by: PHYSICIAN ASSISTANT

## 2024-01-03 PROCEDURE — 99204 OFFICE O/P NEW MOD 45 MIN: CPT | Performed by: PHYSICIAN ASSISTANT

## 2024-01-03 RX ORDER — LORATADINE 10 MG/1
10 TABLET ORAL DAILY
Qty: 90 TABLET | Refills: 1 | Status: SHIPPED | OUTPATIENT
Start: 2024-01-03

## 2024-01-03 RX ORDER — ROSUVASTATIN CALCIUM 40 MG/1
40 TABLET, COATED ORAL EVERY EVENING
COMMUNITY

## 2024-01-03 NOTE — PROGRESS NOTES
[facility] [place] [facility]  [facility] EAR, NOSE AND THROAT  [address]  [address]  [facility] [address]  Dept: [phone]  Dept Fax: [phone]  Loc: [phone]    Montserrat Mendez is a 63 y.o. female who was referred by Nia Mckeon, ASHLEY* for:  Chief Complaint   Patient presents with    New Patient     New patient.   Recurrent sinusitis.   She reports frequent congestion and periodic sinus pain.  CT of facial bones 11/16 at [facility].   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   Referred by ASHLEY Aceves-CNP.   .    HPI:     Montserrat Mendez presents today for for evaluation of nasal congestion.  She reports some ongoing nasal congestion, especially the left nare for several years.  She reports some pain/pressure through the left cheek to the point that she has to take her glasses off because it hurts so badly.  She also has some more mild facial pressure through her frontal sinuses.  She does report a history of environmental allergies.  She has been prescribed Claritin in the past which admittedly uses very seldomly.  She states it has been \"a long time\" since she has taken it.  She has tried Flonase and Astelin in the past, but was unable to tolerate it secondary to burning sensation associated with use.  She did not find it helpful with limited use either.  She also reports some intermittent \"woozy\" sensation as well as some sensation of presyncope.  This happens when she turns quickly and sometimes has a associated spinning sensation.  She denies any syncopal episodes, but has felt presyncopal several times.  She reports a history of a \"cyst\" at the base of her brain that extends into the sinus.  She had previously seen neurology at [facility] for this.  She reports they wanted to do surgery, patient was very concerned about potential side effects of the surgery and

## 2024-02-22 ENCOUNTER — OFFICE VISIT (OUTPATIENT)
Dept: FAMILY MEDICINE CLINIC | Age: 64
End: 2024-02-22
Payer: MEDICARE

## 2024-02-22 VITALS
SYSTOLIC BLOOD PRESSURE: 124 MMHG | HEART RATE: 79 BPM | DIASTOLIC BLOOD PRESSURE: 68 MMHG | TEMPERATURE: 97.1 F | RESPIRATION RATE: 14 BRPM | OXYGEN SATURATION: 96 %

## 2024-02-22 DIAGNOSIS — R73.03 PREDIABETES: ICD-10-CM

## 2024-02-22 DIAGNOSIS — J40 SINOBRONCHITIS: ICD-10-CM

## 2024-02-22 DIAGNOSIS — I10 ESSENTIAL HYPERTENSION: Primary | ICD-10-CM

## 2024-02-22 DIAGNOSIS — M79.671 INTRACTABLE RIGHT HEEL PAIN: ICD-10-CM

## 2024-02-22 DIAGNOSIS — J32.9 SINOBRONCHITIS: ICD-10-CM

## 2024-02-22 PROCEDURE — 3078F DIAST BP <80 MM HG: CPT | Performed by: NURSE PRACTITIONER

## 2024-02-22 PROCEDURE — G8484 FLU IMMUNIZE NO ADMIN: HCPCS | Performed by: NURSE PRACTITIONER

## 2024-02-22 PROCEDURE — G8417 CALC BMI ABV UP PARAM F/U: HCPCS | Performed by: NURSE PRACTITIONER

## 2024-02-22 PROCEDURE — 3017F COLORECTAL CA SCREEN DOC REV: CPT | Performed by: NURSE PRACTITIONER

## 2024-02-22 PROCEDURE — 3074F SYST BP LT 130 MM HG: CPT | Performed by: NURSE PRACTITIONER

## 2024-02-22 PROCEDURE — 1036F TOBACCO NON-USER: CPT | Performed by: NURSE PRACTITIONER

## 2024-02-22 PROCEDURE — 99214 OFFICE O/P EST MOD 30 MIN: CPT | Performed by: NURSE PRACTITIONER

## 2024-02-22 PROCEDURE — G8427 DOCREV CUR MEDS BY ELIG CLIN: HCPCS | Performed by: NURSE PRACTITIONER

## 2024-02-22 RX ORDER — DOXYCYCLINE HYCLATE 100 MG
100 TABLET ORAL 2 TIMES DAILY
Qty: 14 TABLET | Refills: 0 | Status: SHIPPED | OUTPATIENT
Start: 2024-02-22 | End: 2024-02-29

## 2024-02-22 RX ORDER — BENZONATATE 200 MG/1
200 CAPSULE ORAL 3 TIMES DAILY PRN
Qty: 30 CAPSULE | Refills: 0 | Status: SHIPPED | OUTPATIENT
Start: 2024-02-22 | End: 2024-03-03

## 2024-02-22 RX ORDER — MELOXICAM 7.5 MG/1
7.5 TABLET ORAL DAILY
Qty: 21 TABLET | Refills: 0 | Status: SHIPPED | OUTPATIENT
Start: 2024-02-22 | End: 2024-03-14

## 2024-02-22 NOTE — PROGRESS NOTES
Montserrat Mendez is a 63 y.o. female thatpresents for Congestion, Foot Pain, and Cough      History obtained today from Patient.    HPI    Cough and congestion    For the last week  Nasal congestion  Cough  Waking her up every 2 hours   Not taking anything over the counter for her symptoms  No fever  No SOB      Right foot pain    Suddenly started 2 days ago  Slowly getting better but still hurts when bearing weight  Mostly on right heel  Taking Tylenol for the pain  Hx of bone spurs per patient report  No injury or trauma   No swelling  No bruising        I have reviewed the patient's past medical history, past surgical history, allergies,medications, social and family history and I have made updates where appropriate.    Past Medical History:   Diagnosis Date    Brain cyst     GERD (gastroesophageal reflux disease)     Hyperlipidemia     Hypertension        Social History     Tobacco Use    Smoking status: Former     Current packs/day: 0.00     Average packs/day: 1 pack/day for 10.0 years (10.0 ttl pk-yrs)     Types: Cigarettes     Start date: 1980     Quit date: 1990     Years since quittin.1     Passive exposure: Past    Smokeless tobacco: Never   Substance Use Topics    Alcohol use: No    Drug use: No       Family History   Problem Relation Age of Onset    Heart Disease Mother     High Blood Pressure Mother     Diabetes Mother     High Blood Pressure Father     Breast Cancer Sister     Colon Cancer Neg Hx     Cancer Neg Hx          Review of Systems  Review of Systems    Constitutional: Negative for Fever, Chills, Fatigue  Cardiovascular:  Negative forChest Pain, Palpitations  Respiratory:  +Cough, -Wheezing, -Shortness of breath  Gastrointestinal:  Nausea, Vomiting, Diarrhea, Constipation, Blood in stool  Genitourinary:  Negative for Difficulty or painful urination,Change in frequency, Urgency  Skin:  Negative for Color change, Rash, Itching, Wound  Psychiatric:  Negative forAnxiety, Depression,  PAPULAR/scaly/RAISED/REDDENED

## 2024-02-29 DIAGNOSIS — Z13.220 SCREENING FOR HYPERLIPIDEMIA: Primary | ICD-10-CM

## 2024-03-11 DIAGNOSIS — M79.671 INTRACTABLE RIGHT HEEL PAIN: ICD-10-CM

## 2024-03-11 RX ORDER — MELOXICAM 7.5 MG/1
7.5 TABLET ORAL DAILY
Qty: 21 TABLET | Refills: 0 | Status: SHIPPED | OUTPATIENT
Start: 2024-03-11 | End: 2024-04-01

## 2024-03-11 NOTE — TELEPHONE ENCOUNTER
Recent Visits  Date Type Provider Dept   02/22/24 Office Visit Nia Mckeon, APRN - CNP [facility]   11/06/23 Office Visit Nia Mckeon, APRN - CNP [facility]   07/10/23 Office Visit Nia Mckeon, APRN - CNP [facility]   06/13/23 Office Visit Zenaida Henderson, APRN - CNP [facility]   05/16/23 Office Visit Nia Mckeon, APRN - CNP [facility]   02/02/23 Office Visit Nia Mckeon, APRN - CNP [facility]   09/20/22 Office Visit Zenaida Henderson, APRN - CNP [facility]   Showing recent visits within past 540 days with a meds authorizing provider and meeting all other requirements  Future Appointments  Date Type Provider Dept   05/23/24 Appointment Nia Mckeon APRN - CNP [facility]   Showing future appointments within next 150 days with a meds authorizing provider and meeting all other requirements

## 2024-03-21 ENCOUNTER — TELEPHONE (OUTPATIENT)
Dept: FAMILY MEDICINE CLINIC | Age: 64
End: 2024-03-21

## 2024-03-21 NOTE — TELEPHONE ENCOUNTER
Its likely viral if its only been 2 days, recommend over the counter medications for symptom relief.  Can schedule an appt to be seen or tested for influenza which is going around.  Electronically signed by ASHLEY Romero CNP on 3/21/2024 at 12:57 PM

## 2024-03-21 NOTE — TELEPHONE ENCOUNTER
Pt called with symptoms of body aches, nausea, and a sore throat which started 2 days ago. Pt is asking for an antibiotics to be sent to [facility] on [address].      Please advise

## 2024-04-01 RX ORDER — AMLODIPINE BESYLATE 5 MG/1
5 TABLET ORAL DAILY
Qty: 90 TABLET | Refills: 1 | Status: SHIPPED | OUTPATIENT
Start: 2024-04-01

## 2024-04-01 NOTE — TELEPHONE ENCOUNTER
Recent Visits  Date Type Provider Dept   02/22/24 Office Visit Nia Mckeon, APRN - CNP [facility]   11/06/23 Office Visit Nia Mckeon, APRN - CNP [facility]   07/10/23 Office Visit Nia Mckeon, APRN - CNP [facility]   06/13/23 Office Visit Zenaida Henderson APRN - CNP [facility]   05/16/23 Office Visit Nia Mckeon, APRN - CNP [facility]   02/02/23 Office Visit Nia Mckeon, APRN - CNP [facility]   Showing recent visits within past 540 days with a meds authorizing provider and meeting all other requirements  Future Appointments  Date Type Provider Dept   05/23/24 Appointment Nia Mckeon APRN - CNP [facility]   Showing future appointments within next 150 days with a meds authorizing provider and meeting all other requirements

## 2024-04-18 ENCOUNTER — OFFICE VISIT (OUTPATIENT)
Dept: FAMILY MEDICINE CLINIC | Age: 64
End: 2024-04-18
Payer: COMMERCIAL

## 2024-04-18 ENCOUNTER — HOSPITAL ENCOUNTER (OUTPATIENT)
Age: 64
Discharge: HOME | End: 2024-04-18
Payer: COMMERCIAL

## 2024-04-18 ENCOUNTER — TELEPHONE (OUTPATIENT)
Dept: FAMILY MEDICINE CLINIC | Age: 64
End: 2024-04-18

## 2024-04-18 VITALS
HEIGHT: 62 IN | SYSTOLIC BLOOD PRESSURE: 122 MMHG | OXYGEN SATURATION: 98 % | RESPIRATION RATE: 14 BRPM | TEMPERATURE: 97.9 F | DIASTOLIC BLOOD PRESSURE: 70 MMHG | WEIGHT: 173.6 LBS | BODY MASS INDEX: 31.94 KG/M2 | HEART RATE: 55 BPM

## 2024-04-18 DIAGNOSIS — R73.03 PREDIABETES: ICD-10-CM

## 2024-04-18 DIAGNOSIS — Z13.220 SCREENING FOR HYPERLIPIDEMIA: ICD-10-CM

## 2024-04-18 DIAGNOSIS — I10 ESSENTIAL HYPERTENSION: ICD-10-CM

## 2024-04-18 DIAGNOSIS — D64.9 NORMOCYTIC ANEMIA: Primary | ICD-10-CM

## 2024-04-18 DIAGNOSIS — N73.9 ABSCESS OF FEMALE GENITALIA: Primary | ICD-10-CM

## 2024-04-18 DIAGNOSIS — D64.9 NORMOCYTIC ANEMIA: ICD-10-CM

## 2024-04-18 LAB
ALBUMIN SERPL BCG-MCNC: 4.3 G/DL (ref 3.5–5.1)
ALP SERPL-CCNC: 51 U/L (ref 38–126)
ALT SERPL W/O P-5'-P-CCNC: 9 U/L (ref 11–66)
ANION GAP SERPL CALC-SCNC: 10 MEQ/L (ref 8–16)
AST SERPL-CCNC: 23 U/L (ref 5–40)
BASOPHILS ABSOLUTE: 0 THOU/MM3 (ref 0–0.1)
BASOPHILS NFR BLD AUTO: 0.9 %
BILIRUB SERPL-MCNC: 0.4 MG/DL (ref 0.3–1.2)
BUN SERPL-MCNC: 5 MG/DL (ref 7–22)
CALCIUM SERPL-MCNC: 9.2 MG/DL (ref 8.5–10.5)
CHLORIDE SERPL-SCNC: 104 MEQ/L (ref 98–111)
CHOLEST SERPL-MCNC: 176 MG/DL (ref 100–199)
CO2 SERPL-SCNC: 26 MEQ/L (ref 23–33)
CREAT SERPL-MCNC: 0.8 MG/DL (ref 0.4–1.2)
DEPRECATED MEAN GLUCOSE BLD GHB EST-ACNC: 135 MG/DL (ref 70–126)
DEPRECATED RDW RBC AUTO: 44 FL (ref 35–45)
EOSINOPHIL NFR BLD AUTO: 2.4 %
EOSINOPHILS ABSOLUTE: 0.1 THOU/MM3 (ref 0–0.4)
ERYTHROCYTE [DISTWIDTH] IN BLOOD BY AUTOMATED COUNT: 13.6 % (ref 11.5–14.5)
FERRITIN SERPL IA-MCNC: 513 NG/ML (ref 10–291)
GFR SERPL CREATININE-BSD FRML MDRD: 82 ML/MIN/1.73M2
GLUCOSE SERPL-MCNC: 100 MG/DL (ref 70–108)
HBA1C MFR BLD HPLC: 6.5 % (ref 4.4–6.4)
HCT VFR BLD AUTO: 36.3 % (ref 37–47)
HDLC SERPL-MCNC: 51 MG/DL
HGB BLD-MCNC: 11.5 GM/DL (ref 12–16)
HGB RETIC QN AUTO: 30.3 PG (ref 28.2–35.7)
IMM GRANULOCYTES # BLD AUTO: 0 THOU/MM3 (ref 0–0.07)
IMM GRANULOCYTES NFR BLD AUTO: 0 %
IMM RETICS NFR: 9.4 % (ref 3–15.9)
IRON SERPL-MCNC: 57 UG/DL (ref 50–170)
LDLC SERPL CALC-MCNC: 103 MG/DL
LYMPHOCYTES ABSOLUTE: 3.1 THOU/MM3 (ref 1–4.8)
LYMPHOCYTES NFR BLD AUTO: 67.6 %
MCH RBC QN AUTO: 28 PG (ref 26–33)
MCHC RBC AUTO-ENTMCNC: 31.7 GM/DL (ref 32.2–35.5)
MCV RBC AUTO: 88.3 FL (ref 81–99)
MONOCYTES ABSOLUTE: 0.3 THOU/MM3 (ref 0.4–1.3)
MONOCYTES NFR BLD AUTO: 6.3 %
NEUTROPHILS NFR BLD AUTO: 22.8 %
NRBC BLD AUTO-RTO: 0 /100 WBC
PLATELET # BLD AUTO: 295 THOU/MM3 (ref 130–400)
PMV BLD AUTO: 10.1 FL (ref 9.4–12.4)
POTASSIUM SERPL-SCNC: 3.9 MEQ/L (ref 3.5–5.2)
PROT SERPL-MCNC: 8.4 G/DL (ref 6.1–8)
RBC # BLD AUTO: 4.11 MILL/MM3 (ref 4.2–5.4)
RETICS # AUTO: 44 THOU/MM3 (ref 20–115)
RETICS/RBC NFR AUTO: 1.1 % (ref 0.5–2)
SEGMENTED NEUTROPHILS ABSOLUTE COUNT: 1 THOU/MM3 (ref 1.8–7.7)
SODIUM SERPL-SCNC: 140 MEQ/L (ref 135–145)
TIBC SERPL-MCNC: 235 UG/DL (ref 171–450)
TRIGL SERPL-MCNC: 109 MG/DL (ref 0–199)
WBC # BLD AUTO: 4.6 THOU/MM3 (ref 4.8–10.8)

## 2024-04-18 PROCEDURE — G8417 CALC BMI ABV UP PARAM F/U: HCPCS | Performed by: NURSE PRACTITIONER

## 2024-04-18 PROCEDURE — 99214 OFFICE O/P EST MOD 30 MIN: CPT | Performed by: NURSE PRACTITIONER

## 2024-04-18 PROCEDURE — G8427 DOCREV CUR MEDS BY ELIG CLIN: HCPCS | Performed by: NURSE PRACTITIONER

## 2024-04-18 PROCEDURE — 80053 COMPREHEN METABOLIC PANEL: CPT

## 2024-04-18 PROCEDURE — 83036 HEMOGLOBIN GLYCOSYLATED A1C: CPT

## 2024-04-18 PROCEDURE — 85025 COMPLETE CBC W/AUTO DIFF WBC: CPT

## 2024-04-18 PROCEDURE — 36415 COLL VENOUS BLD VENIPUNCTURE: CPT

## 2024-04-18 PROCEDURE — 3017F COLORECTAL CA SCREEN DOC REV: CPT | Performed by: NURSE PRACTITIONER

## 2024-04-18 PROCEDURE — 3074F SYST BP LT 130 MM HG: CPT | Performed by: NURSE PRACTITIONER

## 2024-04-18 PROCEDURE — 80061 LIPID PANEL: CPT

## 2024-04-18 PROCEDURE — 1036F TOBACCO NON-USER: CPT | Performed by: NURSE PRACTITIONER

## 2024-04-18 PROCEDURE — 3078F DIAST BP <80 MM HG: CPT | Performed by: NURSE PRACTITIONER

## 2024-04-18 RX ORDER — DOXYCYCLINE HYCLATE 100 MG
100 TABLET ORAL 2 TIMES DAILY
Qty: 20 TABLET | Refills: 0 | Status: SHIPPED | OUTPATIENT
Start: 2024-04-18 | End: 2024-04-28

## 2024-04-18 SDOH — ECONOMIC STABILITY: INCOME INSECURITY: HOW HARD IS IT FOR YOU TO PAY FOR THE VERY BASICS LIKE FOOD, HOUSING, MEDICAL CARE, AND HEATING?: PATIENT DECLINED

## 2024-04-18 ASSESSMENT — PATIENT HEALTH QUESTIONNAIRE - PHQ9
SUM OF ALL RESPONSES TO PHQ QUESTIONS 1-9: 0
SUM OF ALL RESPONSES TO PHQ9 QUESTIONS 1 & 2: 0
1. LITTLE INTEREST OR PLEASURE IN DOING THINGS: NOT AT ALL
SUM OF ALL RESPONSES TO PHQ QUESTIONS 1-9: 0
2. FEELING DOWN, DEPRESSED OR HOPELESS: NOT AT ALL

## 2024-04-18 ASSESSMENT — ENCOUNTER SYMPTOMS: COLOR CHANGE: 1

## 2024-04-18 NOTE — TELEPHONE ENCOUNTER
Called and spoke to Jose at the lab.   He can add all the labs but the b12 and the folate.   Pt will have to go back to get the other 2 drawn.     Unable to leave a message for the pt   2 messages in encounter   Pt will need to be called again

## 2024-04-18 NOTE — TELEPHONE ENCOUNTER
----- Message from ASHLEY Romero CNP sent at 4/18/2024 12:30 PM EDT -----  A1c stable at 6.5  Continue limiting carbs/sweets, and breads and pastas

## 2024-04-18 NOTE — TELEPHONE ENCOUNTER
----- Message from Nia Mckeon, ASHLEY - CNP sent at 4/18/2024 11:50 AM EDT -----  Labs stable, however her hemoglobin remains on low end of normal   I would like to do iron labs, can we call lab to see if we can add these labs to the ones already drawn today?

## 2024-04-18 NOTE — TELEPHONE ENCOUNTER
----- Message from ASHLEY Romero - CNP sent at 4/18/2024  1:26 PM EDT -----  So far iron labs are normal   Any dark tarry stools?  Recommend fu with GI, she has seen Dr Lamb for possible EGD and Colonoscopy to assure she isn't losing any blood in GI tract before proceeding any further

## 2024-04-18 NOTE — PROGRESS NOTES
Montserrat Mendez is a 63 y.o. female thatpresents for Other      History obtained today from Patient and .    HPI    Boil    Last week  Started out larger, now its smaller  Its draining currently  Called OBGYN hasn't seen since 2017  Has appt next month with them  Using warm compresses   No fevers or chills  Still draining  Pain is improved since starting draining  No urinary symptoms  Hx of gynecological surgeries      I have reviewed the patient's past medical history, past surgical history, allergies,medications, social and family history and I have made updates where appropriate.    Past Medical History:   Diagnosis Date    Brain cyst     GERD (gastroesophageal reflux disease)     Hyperlipidemia     Hypertension        Social History     Tobacco Use    Smoking status: Former     Current packs/day: 0.00     Average packs/day: 1 pack/day for 10.0 years (10.0 ttl pk-yrs)     Types: Cigarettes     Start date: 1980     Quit date: 1990     Years since quittin.3     Passive exposure: Past    Smokeless tobacco: Never   Substance Use Topics    Alcohol use: No    Drug use: No       Family History   Problem Relation Age of Onset    Heart Disease Mother     High Blood Pressure Mother     Diabetes Mother     High Blood Pressure Father     Breast Cancer Sister     Colon Cancer Neg Hx     Cancer Neg Hx          Review of Systems   Constitutional:  Positive for appetite change. Negative for fatigue and fever.   Skin:  Positive for color change.   Psychiatric/Behavioral: Negative.             PHYSICAL EXAM:  /70   Pulse 55   Temp 97.9 °F (36.6 °C) (Temporal)   Resp 14   Ht 1.575 m (5' 2\")   Wt 78.7 kg (173 lb 9.6 oz)   SpO2 98%   BMI 31.75 kg/m²     Physical Exam  Constitutional:       Appearance: Normal appearance.   Pulmonary:      Breath sounds: Normal breath sounds.   Abdominal:      General: Abdomen is flat.      Palpations: Abdomen is soft.   Skin:     General: Skin is warm and dry.

## 2024-04-19 LAB
REASON FOR REJECTION: NORMAL
REASON FOR REJECTION: NORMAL
REJECTED TEST: NORMAL
REJECTED TEST: NORMAL

## 2024-04-19 NOTE — TELEPHONE ENCOUNTER
Pt informed and understanding of results  Pt has not had any dark tarry stools  Pt will call Dr. Lamb  Pt will go to [facility] to get her b12 and folate labs drawn

## 2024-04-24 ENCOUNTER — TELEPHONE (OUTPATIENT)
Dept: FAMILY MEDICINE CLINIC | Age: 64
End: 2024-04-24

## 2024-04-24 DIAGNOSIS — E53.8 VITAMIN B12 DEFICIENCY: ICD-10-CM

## 2024-04-24 DIAGNOSIS — E53.8 FOLATE DEFICIENCY: Primary | ICD-10-CM

## 2024-04-24 RX ORDER — FOLIC ACID 1 MG/1
1 TABLET ORAL DAILY
Qty: 90 TABLET | Refills: 1 | Status: SHIPPED | OUTPATIENT
Start: 2024-04-24

## 2024-04-24 RX ORDER — BIOTIN 10 MG
1 TABLET ORAL DAILY
Qty: 90 TABLET | Refills: 3 | Status: SHIPPED | OUTPATIENT
Start: 2024-04-24

## 2024-04-24 NOTE — TELEPHONE ENCOUNTER
Patient informed, verbally understood.    Pt is agreeable to the folic acid and B12 supplements. Pt is wanting to due the pill form on both medications.    [facility] on [address]

## 2024-04-24 NOTE — TELEPHONE ENCOUNTER
Pt informed and verbalized understanding.     Pt states she will go to [facility] in 6 weeks/orders in epic  She will jimenez her calendar

## 2024-04-24 NOTE — TELEPHONE ENCOUNTER
----- Message from ASHLEY Romero - CNP sent at 4/24/2024  8:59 AM EDT -----  Her vitamin b12 and folate are on lower end  If interested in supplementing we can start on folic acid replacement 1 mg per day, and vitamin b12 can be replaced either by injection or by pill. Let me know if she has preference on either one. The pill would be every day. The injection is weekly for a month, then monthly from then on out

## 2024-04-25 ENCOUNTER — OFFICE VISIT (OUTPATIENT)
Dept: FAMILY MEDICINE CLINIC | Age: 64
End: 2024-04-25
Payer: COMMERCIAL

## 2024-04-25 VITALS
HEIGHT: 62 IN | HEART RATE: 64 BPM | WEIGHT: 174.4 LBS | DIASTOLIC BLOOD PRESSURE: 64 MMHG | BODY MASS INDEX: 32.09 KG/M2 | SYSTOLIC BLOOD PRESSURE: 122 MMHG | RESPIRATION RATE: 14 BRPM | TEMPERATURE: 97 F | OXYGEN SATURATION: 98 %

## 2024-04-25 DIAGNOSIS — N73.9 ABSCESS OF FEMALE GENITALIA: ICD-10-CM

## 2024-04-25 PROBLEM — E11.9 TYPE 2 DIABETES MELLITUS (HCC): Status: ACTIVE | Noted: 2024-04-25

## 2024-04-25 PROCEDURE — 3078F DIAST BP <80 MM HG: CPT | Performed by: NURSE PRACTITIONER

## 2024-04-25 PROCEDURE — G8417 CALC BMI ABV UP PARAM F/U: HCPCS | Performed by: NURSE PRACTITIONER

## 2024-04-25 PROCEDURE — 1036F TOBACCO NON-USER: CPT | Performed by: NURSE PRACTITIONER

## 2024-04-25 PROCEDURE — 3017F COLORECTAL CA SCREEN DOC REV: CPT | Performed by: NURSE PRACTITIONER

## 2024-04-25 PROCEDURE — 99213 OFFICE O/P EST LOW 20 MIN: CPT | Performed by: NURSE PRACTITIONER

## 2024-04-25 PROCEDURE — 3074F SYST BP LT 130 MM HG: CPT | Performed by: NURSE PRACTITIONER

## 2024-04-25 PROCEDURE — G8427 DOCREV CUR MEDS BY ELIG CLIN: HCPCS | Performed by: NURSE PRACTITIONER

## 2024-04-25 RX ORDER — DOXYCYCLINE HYCLATE 100 MG
100 TABLET ORAL 2 TIMES DAILY
Qty: 14 TABLET | Refills: 0 | Status: SHIPPED | OUTPATIENT
Start: 2024-04-25 | End: 2024-05-02

## 2024-04-25 NOTE — PROGRESS NOTES
Montserrat Mendez is a 63 y.o. female thatpresents for Follow-up      History obtained today from Patient.    HPI  Boil     Last week  Started out larger, now its smaller  Its draining currently  Called OBGYN hasn't seen since 2017  Has appt next month with them  Using warm compresses   No fevers or chills  Still draining  Pain is improved since starting draining  No urinary symptoms  Hx of gynecological surgeries      Update 2024    On Doxycycline, pt reports pain is much better 2/10 and abscess has gotten much smaller. Scheduled for OBGYN next week wants to wait to see them.  Less indurated.  No fevers      I have reviewed the patient's past medical history, past surgical history, allergies,medications, social and family history and I have made updates where appropriate.    Past Medical History:   Diagnosis Date    Brain cyst     GERD (gastroesophageal reflux disease)     Hyperlipidemia     Hypertension        Social History     Tobacco Use    Smoking status: Former     Current packs/day: 0.00     Average packs/day: 1 pack/day for 10.0 years (10.0 ttl pk-yrs)     Types: Cigarettes     Start date: 1980     Quit date: 1990     Years since quittin.3     Passive exposure: Past    Smokeless tobacco: Never   Substance Use Topics    Alcohol use: No    Drug use: No       Family History   Problem Relation Age of Onset    Heart Disease Mother     High Blood Pressure Mother     Diabetes Mother     High Blood Pressure Father     Breast Cancer Sister     Colon Cancer Neg Hx     Cancer Neg Hx          Review of Systems   Constitutional:  Negative for fever.   Skin:  Positive for wound.           PHYSICAL EXAM:  /64   Pulse 64   Temp 97 °F (36.1 °C) (Temporal)   Resp 14   Ht 1.575 m (5' 2\")   Wt 79.1 kg (174 lb 6.4 oz)   SpO2 98%   BMI 31.90 kg/m²     Physical Exam  Constitutional:       Appearance: Normal appearance.   Skin:     Findings: Abscess present.   Neurological:      Mental Status: She is

## 2024-07-02 RX ORDER — ROSUVASTATIN CALCIUM 40 MG/1
40 TABLET, COATED ORAL NIGHTLY
Qty: 90 TABLET | Refills: 3 | Status: SHIPPED | OUTPATIENT
Start: 2024-07-02

## 2024-07-02 NOTE — TELEPHONE ENCOUNTER
Recent Visits  Date Type Provider Dept   04/25/24 Office Visit Nia Mckeon, APRN - CNP [facility]   04/18/24 Office Visit Nia Mckeon, APRN - CNP [facility]   02/22/24 Office Visit Nia Mckeon, APRN - CNP [facility]   11/06/23 Office Visit Nia Mckeon, APRN - CNP [facility]   07/10/23 Office Visit Nia Mckeon, APRN - CNP [facility]   06/13/23 Office Visit Zenaida Henderson, APRN - CNP [facility]   05/16/23 Office Visit Nia Mckeon, APRN - CNP [facility]   02/02/23 Office Visit Nia Mckeon, APRN - CNP [facility]   Showing recent visits within past 540 days with a meds authorizing provider and meeting all other requirements  Future Appointments  No visits were found meeting these conditions.  Showing future appointments within next 150 days with a meds authorizing provider and meeting all other requirements

## 2024-08-01 ENCOUNTER — HOSPITAL ENCOUNTER (EMERGENCY)
Age: 64
Discharge: HOME | End: 2024-08-01
Attending: EMERGENCY MEDICINE
Payer: COMMERCIAL

## 2024-08-01 VITALS
HEART RATE: 67 BPM | RESPIRATION RATE: 16 BRPM | TEMPERATURE: 98.8 F | SYSTOLIC BLOOD PRESSURE: 153 MMHG | OXYGEN SATURATION: 100 % | DIASTOLIC BLOOD PRESSURE: 63 MMHG

## 2024-08-01 DIAGNOSIS — L98.9 NODULAR LESION ON SURFACE OF SKIN: Primary | ICD-10-CM

## 2024-08-01 PROCEDURE — 99283 EMERGENCY DEPT VISIT LOW MDM: CPT

## 2024-08-01 PROCEDURE — 6370000000 HC RX 637 (ALT 250 FOR IP): Performed by: EMERGENCY MEDICINE

## 2024-08-01 RX ORDER — LIDOCAINE HYDROCHLORIDE 20 MG/ML
5 SOLUTION OROPHARYNGEAL ONCE
Status: COMPLETED | OUTPATIENT
Start: 2024-08-01 | End: 2024-08-01

## 2024-08-01 RX ORDER — ACETAMINOPHEN 500 MG
1000 TABLET ORAL ONCE
Status: COMPLETED | OUTPATIENT
Start: 2024-08-01 | End: 2024-08-01

## 2024-08-01 RX ADMIN — LIDOCAINE HYDROCHLORIDE 5 ML: 20 SOLUTION ORAL at 19:54

## 2024-08-01 RX ADMIN — ACETAMINOPHEN 1000 MG: 500 TABLET ORAL at 19:54

## 2024-08-01 NOTE — DISCHARGE INSTRUCTIONS
Please take Tylenol or Motrin to manage pain.  Please take them as recommended.  I also recommend keeping the lesion covered with Band-Aid to avoid clothes or bra rubbing against it.  Please go to your scheduled dermatology appointment next week.  If you develop any fever, significant redness around the lesion, or feel significant heat coming from the lesion please return to the ED.  Otherwise this time I recommend following up with your scheduled dermatology appointment.

## 2024-08-01 NOTE — ED PROVIDER NOTES

## 2024-08-01 NOTE — ED PROVIDER NOTES
[facility] EMERGENCY DEPT    EMERGENCY MEDICINE      Pt Name: Montserrat Mendez  MRN: [id number]  Birthdate 1960  Date of evaluation: 8/1/2024  Treating Physician: Dr. Gonzalez  Resident Physician: Gerald Duarte MD    CHIEF COMPLAINT       Chief Complaint   Patient presents with    Sore     On left side chest for months- states its just sore     History obtained from chart review and the patient.    HISTORY OF PRESENT ILLNESS    HPI    Montserrat Mendez is a 64 y.o. female presents to the emergency department for evaluation of lesion on her left upper chest.  Patient stated that this lesion has been present for several years but over the past 2 weeks and it has grown and become \"sore\".  She noticed the onset after being out and working for extended period of time she believes her bra had rubbed up against it a lot causing it to become sore.  Patient stated that she has an appointment with dermatology next week but did not think she could wait due to the soreness.  Patient stated that she has not taken anything at home to help manage the soreness.  She is denying any fever, chest pain, shortness of breath, nausea, vomiting, trauma.    The patient has no other acute complaints at this time.    PAST MEDICAL AND SURGICAL HISTORY     Past Medical History:   Diagnosis Date    Brain cyst     GERD (gastroesophageal reflux disease)     Hyperlipidemia     Hypertension        Past Surgical History:   Procedure Laterality Date    COLONOSCOPY  2009    COLONOSCOPY Left 10/22/2019    COLONOSCOPY DIAGNOSTIC performed by Calos Lamb MD at [facility]    EYE SURGERY Right 2017    EYE SURGERY      HYSTERECTOMY (CERVIX STATUS UNKNOWN)      UPPER GASTROINTESTINAL ENDOSCOPY  2016       CURRENT MEDICATIONS     Previous Medications    AMLODIPINE (NORVASC) 5 MG TABLET    TAKE 1 TABLET BY MOUTH DAILY    CYANOCOBALAMIN (VITAMIN B-12) 3000 MCG SUBL    Place 1 each under the tongue daily    FOLIC ACID (FOLVITE) 1 MG TABLET    Take 1

## 2024-08-01 NOTE — ED NOTES
Pt states she has had this ingrown 'blackhead' forever but its been getting sore recently. States she hasn't tried anything OTC for pain and has a dermatology appt next week but was worried it was infected. Pt otherwise appears in no acute distress, VSS.

## 2024-09-05 DIAGNOSIS — S00.01XA ABRASION OF SCALP, INITIAL ENCOUNTER: ICD-10-CM

## 2024-09-05 RX ORDER — MUPIROCIN 20 MG/G
OINTMENT TOPICAL
Qty: 1 EACH | Refills: 1 | Status: SHIPPED | OUTPATIENT
Start: 2024-09-05 | End: 2024-09-12

## 2024-10-07 ENCOUNTER — TELEPHONE (OUTPATIENT)
Dept: FAMILY MEDICINE CLINIC | Age: 64
End: 2024-10-07

## 2024-10-07 RX ORDER — IBUPROFEN 800 MG/1
800 TABLET, FILM COATED ORAL EVERY 8 HOURS PRN
Qty: 90 TABLET | Refills: 0 | Status: SHIPPED | OUTPATIENT
Start: 2024-10-07

## 2024-10-07 NOTE — TELEPHONE ENCOUNTER
No she did not have the tooth pulled, they did an x-ray and poked around a bit to make it hurt more. Did give antibiotic but no pain medication.

## 2024-10-07 NOTE — TELEPHONE ENCOUNTER
Ok so she's on antibiotic And asking for pain medication?  If she isn't taking meloxicam ill send in a short course of ibuprofen 800 mg, she can't take both  Fu if no better

## 2024-10-07 NOTE — TELEPHONE ENCOUNTER
Spoke to patient she is having intense pain and the dentist did not send her home with anything. She went to dentist because of the infection, she is taking tylenol which dulls the pain a little bit but still very painful.

## 2024-10-07 NOTE — TELEPHONE ENCOUNTER
Is she having pain? She had the infection while at the dentist and they didn't give her anything? Or she had infection after seeing dentist?

## 2024-10-07 NOTE — TELEPHONE ENCOUNTER
Patient called in stating she seen the dentist last week, had an infection in lower tooth on left side. They did not give her any medication for it, wondering if you could call something in.    Ph[facility]acy is [facility] on [address]

## 2024-10-18 ENCOUNTER — TELEPHONE (OUTPATIENT)
Dept: FAMILY MEDICINE CLINIC | Age: 64
End: 2024-10-18

## 2024-10-18 DIAGNOSIS — Z12.31 ENCOUNTER FOR SCREENING MAMMOGRAM FOR BREAST CANCER: Primary | ICD-10-CM

## 2024-10-22 ENCOUNTER — OFFICE VISIT (OUTPATIENT)
Dept: FAMILY MEDICINE CLINIC | Age: 64
End: 2024-10-22
Payer: COMMERCIAL

## 2024-10-22 VITALS
WEIGHT: 175.6 LBS | BODY MASS INDEX: 32.31 KG/M2 | HEIGHT: 62 IN | RESPIRATION RATE: 14 BRPM | OXYGEN SATURATION: 100 % | DIASTOLIC BLOOD PRESSURE: 66 MMHG | TEMPERATURE: 98.5 F | HEART RATE: 81 BPM | SYSTOLIC BLOOD PRESSURE: 124 MMHG

## 2024-10-22 DIAGNOSIS — Z78.0 POST-MENOPAUSAL: Primary | ICD-10-CM

## 2024-10-22 DIAGNOSIS — E11.9 TYPE 2 DIABETES MELLITUS WITHOUT COMPLICATION, WITHOUT LONG-TERM CURRENT USE OF INSULIN (HCC): ICD-10-CM

## 2024-10-22 DIAGNOSIS — I10 ESSENTIAL HYPERTENSION: ICD-10-CM

## 2024-10-22 DIAGNOSIS — E78.5 DYSLIPIDEMIA: ICD-10-CM

## 2024-10-22 PROBLEM — M75.42 IMPINGEMENT SYNDROME OF LEFT SHOULDER REGION: Status: ACTIVE | Noted: 2024-10-22

## 2024-10-22 LAB
HBA1C MFR BLD: 5.9 %
MICROALB/CREAT RATIO POC: < 30 MG/G
MICROALBUMIN/CREAT UR-RTO: 10 MG/L
POC CREATININE, URINE: 100 MG/DL

## 2024-10-22 PROCEDURE — 1123F ACP DISCUSS/DSCN MKR DOCD: CPT | Performed by: NURSE PRACTITIONER

## 2024-10-22 PROCEDURE — 3044F HG A1C LEVEL LT 7.0%: CPT | Performed by: NURSE PRACTITIONER

## 2024-10-22 PROCEDURE — 3074F SYST BP LT 130 MM HG: CPT | Performed by: NURSE PRACTITIONER

## 2024-10-22 PROCEDURE — 83036 HEMOGLOBIN GLYCOSYLATED A1C: CPT | Performed by: NURSE PRACTITIONER

## 2024-10-22 PROCEDURE — 99214 OFFICE O/P EST MOD 30 MIN: CPT | Performed by: NURSE PRACTITIONER

## 2024-10-22 PROCEDURE — 3078F DIAST BP <80 MM HG: CPT | Performed by: NURSE PRACTITIONER

## 2024-10-22 NOTE — PROGRESS NOTES
Montserrat Mendez is a 64 y.o. female thatpresents for Follow-up Chronic Condition and Diabetes      History obtained today from Patient.  Here today with patient is her     HPI      Diabetes  Diet controlled  The 10-year ASCVD risk score (Nick CHAVES, et al., 2019) is: 17.5%  Statin intolerance listed on allergies  Urine microalbumin normal  But on statin Crestor and tolerating well     HTN  Amlodipine  Stable  Controlled today    Anemia-    stable  Iron labs ok  Folate low, started on supplementation but had side effects      Colon cancer screening -  with Dr Lamb, per patient not due till     Breast cancer screening - [facility], scheduled tomorrow  Dexa- [facility] scheduled tomorrow    I have reviewed the patient's past medical history, past surgical history, allergies,medications, social and family history and I have made updates where appropriate.    Past Medical History:   Diagnosis Date    Brain cyst     GERD (gastroesophageal reflux disease)     Hyperlipidemia     Hypertension        Social History     Tobacco Use    Smoking status: Former     Current packs/day: 0.00     Average packs/day: 1 pack/day for 10.0 years (10.0 ttl pk-yrs)     Types: Cigarettes     Start date: 1980     Quit date: 1990     Years since quittin.8     Passive exposure: Past    Smokeless tobacco: Never   Substance Use Topics    Alcohol use: No    Drug use: No       Family History   Problem Relation Age of Onset    Heart Disease Mother     High Blood Pressure Mother     Diabetes Mother     High Blood Pressure Father     Breast Cancer Sister     Colon Cancer Neg Hx     Cancer Neg Hx          Review of Systems    Review of Systems    Constitutional: Negative for Fever, Chills, Fatigue  Cardiovascular:  Negative forChest Pain, Palpitations  Respiratory:  Negative for Cough, Wheezing, Shortness of breath  Gastrointestinal:  Nausea, Vomiting, Diarrhea, Constipation, Blood in stool  Genitourinary:  Negative for Difficulty or

## 2024-11-22 NOTE — TELEPHONE ENCOUNTER
Can we see how patient's blood pressure is doing on the amlodipine? Also if her neck pain is persisting, is she interested in Physical therapy?  I will refill zanaflex
I called and spoke to [facility] and she states that her blood pressure is doing good. Pt does not want to try physical therapy although she has been having issues with neck pain.
I would recommend physical therapy or seeing orthopedics if her neck pain is continuing.    Let me know her thoughts
Patient informed and verbalized understanding. Patient states she would like to see  Orthopedic.  She would like to go to [facility], no preference in doctors
Referral placed
No

## 2024-12-09 ENCOUNTER — TELEPHONE (OUTPATIENT)
Dept: FAMILY MEDICINE CLINIC | Age: 64
End: 2024-12-09

## 2024-12-09 RX ORDER — AZITHROMYCIN 250 MG/1
TABLET, FILM COATED ORAL
Qty: 6 TABLET | Refills: 0 | Status: SHIPPED | OUTPATIENT
Start: 2024-12-09 | End: 2024-12-19

## 2024-12-09 NOTE — TELEPHONE ENCOUNTER
Pt called and stated she is having all the same symptoms as Vadim from last week. Cough, congestion, ears are plugged. She is asking if she can get a Z-nash. She is sure she got sick from him. Symptoms started Friday. [facility] [address]  No fever or body chills. She has not taken at OTC medications.

## 2024-12-26 ENCOUNTER — TELEPHONE (OUTPATIENT)
Dept: FAMILY MEDICINE CLINIC | Age: 64
End: 2024-12-26

## 2024-12-26 DIAGNOSIS — J40 SINOBRONCHITIS: Primary | ICD-10-CM

## 2024-12-26 DIAGNOSIS — J32.9 SINOBRONCHITIS: Primary | ICD-10-CM

## 2024-12-26 RX ORDER — CODEINE PHOSPHATE AND GUAIFENESIN 10; 100 MG/5ML; MG/5ML
5 SOLUTION ORAL 3 TIMES DAILY PRN
Qty: 75 ML | Refills: 0 | Status: SHIPPED | OUTPATIENT
Start: 2024-12-26 | End: 2024-12-31

## 2024-12-26 NOTE — TELEPHONE ENCOUNTER
Pt called in and was having sxs of cough, fever, sore throat and was wanting something to help with sxs.      Future Appointments   Date Time Provider Department Center   12/27/2024 10:00 AM Nia Mckeon APRN - CNP [facility]   4/22/2025 11:00 AM Nia Mckeon APRN - CNP [facility]     Didn't know if you wanted to send something in or wait for pt to be seen.

## 2024-12-26 NOTE — TELEPHONE ENCOUNTER
Pt informed to take tylenol or motrin for fever and cough medicine has been sent in . Pt voiced understanding with no further questions at this time.

## 2024-12-27 ENCOUNTER — OFFICE VISIT (OUTPATIENT)
Dept: FAMILY MEDICINE CLINIC | Age: 64
End: 2024-12-27

## 2024-12-27 VITALS
DIASTOLIC BLOOD PRESSURE: 66 MMHG | RESPIRATION RATE: 14 BRPM | BODY MASS INDEX: 32.28 KG/M2 | HEART RATE: 66 BPM | HEIGHT: 62 IN | SYSTOLIC BLOOD PRESSURE: 114 MMHG | OXYGEN SATURATION: 99 % | TEMPERATURE: 98.3 F | WEIGHT: 175.4 LBS

## 2024-12-27 DIAGNOSIS — Z78.0 POST-MENOPAUSAL: Primary | ICD-10-CM

## 2024-12-27 DIAGNOSIS — J40 SINOBRONCHITIS: ICD-10-CM

## 2024-12-27 DIAGNOSIS — J32.9 SINOBRONCHITIS: ICD-10-CM

## 2024-12-27 DIAGNOSIS — Z12.31 ENCOUNTER FOR SCREENING MAMMOGRAM FOR BREAST CANCER: ICD-10-CM

## 2024-12-27 RX ORDER — PSEUDOEPHEDRINE HCL 30 MG/1
30 TABLET, FILM COATED ORAL EVERY 6 HOURS PRN
Qty: 24 TABLET | Refills: 1 | Status: SHIPPED | OUTPATIENT
Start: 2024-12-27 | End: 2025-12-27

## 2024-12-27 RX ORDER — DOXYCYCLINE HYCLATE 100 MG
100 TABLET ORAL 2 TIMES DAILY
Qty: 14 TABLET | Refills: 0 | Status: SHIPPED | OUTPATIENT
Start: 2024-12-27 | End: 2025-01-03

## 2024-12-27 NOTE — PROGRESS NOTES
SUBJECTIVE:  Montserrat Mendez is a 64 y.o. y/o female that presents with Cough, Congestion, and Generalized Body Aches  .    HPI:    Here today with     Symptoms have been present for 3 day(s).  Symptoms are unchanged since they initially started.    Fever? No  Runny nose or congestion?  Yes  Cough?  Yes  Sore throat?  Yes  Shortness of breath/Wheezing? No  Other associated symptoms?  body aches and headaches  Sent in cough medication but hasn't picked it up    was sick Andes Nora  Eating and drinking ok  Hasn't taken anything over the counter for the symptoms       Past Medical History:   Diagnosis Date    Brain cyst     GERD (gastroesophageal reflux disease)     Hyperlipidemia     Hypertension          Tobacco Use      Smoking status: Former        Packs/day: 0.00        Years: 1 pack/day for 10.0 years (10.0 ttl pk-yrs)        Types: Cigarettes        Start date: 1980        Quit date: 1990        Years since quittin.0        Passive exposure: Past      Smokeless tobacco: Never            OBJECTIVE:  /66   Pulse 66   Temp 98.3 °F (36.8 °C) (Temporal)   Resp 14   Ht 1.575 m (5' 2\")   Wt 79.6 kg (175 lb 6.4 oz)   SpO2 99%   BMI 32.08 kg/m²   General appearance: alert, well appearing, and in no distress and oriented to person, place, and time.  ENT exam reveals - bilateral TM fluid noted, neck without nodes, and throat normal without erythema or exudate.   CVS exam: normal rate, regular rhythm, normal S1, S2, no murmurs, rubs, clicks or gallops.  Chest:clear to auscultation, no wheezes, rales or rhonchi, symmetric air entry  Abdominal exam: soft, nontender, nondistended, no masses or organomegaly.  Extremities:  No clubbing, cyanosis or edema  Skin exam - normal coloration and turgor, no rashes, no suspicious skin lesions noted.  Psych -  Affect appropriate.  Thought process is normal without evidence of depression or psychosis.    Good insight and appropriae interaction.

## 2025-01-06 RX ORDER — AMLODIPINE BESYLATE 5 MG/1
5 TABLET ORAL DAILY
Qty: 90 TABLET | Refills: 1 | Status: SHIPPED | OUTPATIENT
Start: 2025-01-06

## 2025-01-06 NOTE — TELEPHONE ENCOUNTER
Recent Visits  Date Type Provider Dept   12/27/24 Office Visit Nia Mckeon, APRN - CNP [facility]   10/22/24 Office Visit Nia Mckeon, APRN - CNP [facility]   04/25/24 Office Visit Nia Mckeon, APRN - CNP [facility]   04/18/24 Office Visit Nia Mckeon, APRN - CNP [facility]   02/22/24 Office Visit Nia Mckeon, APRN - CNP [facility]   11/06/23 Office Visit Nia Mckeon, APRN - CNP [facility]   Showing recent visits within past 540 days with a meds authorizing provider and meeting all other requirements  Future Appointments  Date Type Provider Dept   04/22/25 Appointment Nia Mckeon, APRN - CNP [facility]   Showing future appointments within next 150 days with a meds authorizing provider and meeting all other requirements

## 2025-01-08 ENCOUNTER — TELEPHONE (OUTPATIENT)
Dept: FAMILY MEDICINE CLINIC | Age: 65
End: 2025-01-08

## 2025-01-29 ENCOUNTER — TELEPHONE (OUTPATIENT)
Dept: FAMILY MEDICINE CLINIC | Age: 65
End: 2025-01-29

## 2025-01-29 NOTE — TELEPHONE ENCOUNTER
----- Message from ASHLEY Lassiter CNP sent at 1/29/2025  1:39 PM EST -----  Dexa with normal bone density

## 2025-01-29 NOTE — TELEPHONE ENCOUNTER
Pt informed of normal dexa scan results. Pt voiced understanding with no further questions at this time.

## 2025-02-01 RX ORDER — LOPERAMIDE HYDROCHLORIDE 2 MG/1
2 CAPSULE ORAL 4 TIMES DAILY PRN
Qty: 21 CAPSULE | Refills: 0 | Status: SHIPPED | OUTPATIENT
Start: 2025-02-01 | End: 2025-02-08

## 2025-02-01 RX ORDER — ONDANSETRON 4 MG/1
4 TABLET, ORALLY DISINTEGRATING ORAL 3 TIMES DAILY PRN
Qty: 21 TABLET | Refills: 0 | Status: SHIPPED | OUTPATIENT
Start: 2025-02-01

## 2025-02-10 ENCOUNTER — TELEPHONE (OUTPATIENT)
Dept: FAMILY MEDICINE CLINIC | Age: 65
End: 2025-02-10

## 2025-02-10 RX ORDER — DOXYCYCLINE HYCLATE 100 MG
100 TABLET ORAL 2 TIMES DAILY
Qty: 14 TABLET | Refills: 0 | Status: SHIPPED | OUTPATIENT
Start: 2025-02-10 | End: 2025-02-17

## 2025-02-10 NOTE — TELEPHONE ENCOUNTER
Pt called in stating she has a cough that wont go away and congestion sinus pressure.      Pt would like something to be sent in         [facility] [address].

## 2025-02-10 NOTE — TELEPHONE ENCOUNTER
Left detailed message about medication being sent in. Okay per HIPAA. Requested call back at [phone]  if they have any further questions.

## 2025-04-01 ENCOUNTER — OFFICE VISIT (OUTPATIENT)
Dept: FAMILY MEDICINE CLINIC | Age: 65
End: 2025-04-01
Payer: COMMERCIAL

## 2025-04-01 VITALS
HEIGHT: 62 IN | BODY MASS INDEX: 30.59 KG/M2 | DIASTOLIC BLOOD PRESSURE: 68 MMHG | WEIGHT: 166.2 LBS | SYSTOLIC BLOOD PRESSURE: 118 MMHG | TEMPERATURE: 99.1 F | RESPIRATION RATE: 14 BRPM

## 2025-04-01 DIAGNOSIS — E11.9 TYPE 2 DIABETES MELLITUS WITHOUT COMPLICATION, WITHOUT LONG-TERM CURRENT USE OF INSULIN: ICD-10-CM

## 2025-04-01 DIAGNOSIS — J02.0 STREP THROAT: ICD-10-CM

## 2025-04-01 DIAGNOSIS — J02.9 SORE THROAT: Primary | ICD-10-CM

## 2025-04-01 DIAGNOSIS — R11.0 NAUSEA: ICD-10-CM

## 2025-04-01 LAB
HBA1C MFR BLD: 6.1 % (ref 4.3–5.7)
S PYO AG THROAT QL: POSITIVE

## 2025-04-01 PROCEDURE — 99214 OFFICE O/P EST MOD 30 MIN: CPT | Performed by: NURSE PRACTITIONER

## 2025-04-01 PROCEDURE — 3078F DIAST BP <80 MM HG: CPT | Performed by: NURSE PRACTITIONER

## 2025-04-01 PROCEDURE — 87880 STREP A ASSAY W/OPTIC: CPT | Performed by: NURSE PRACTITIONER

## 2025-04-01 PROCEDURE — 3044F HG A1C LEVEL LT 7.0%: CPT | Performed by: NURSE PRACTITIONER

## 2025-04-01 PROCEDURE — 3074F SYST BP LT 130 MM HG: CPT | Performed by: NURSE PRACTITIONER

## 2025-04-01 PROCEDURE — 1123F ACP DISCUSS/DSCN MKR DOCD: CPT | Performed by: NURSE PRACTITIONER

## 2025-04-01 RX ORDER — ONDANSETRON 4 MG/1
4 TABLET, ORALLY DISINTEGRATING ORAL 3 TIMES DAILY PRN
Qty: 21 TABLET | Refills: 0 | Status: SHIPPED | OUTPATIENT
Start: 2025-04-01

## 2025-04-01 RX ORDER — AZITHROMYCIN 250 MG/1
TABLET, FILM COATED ORAL
Qty: 1 PACKET | Refills: 0 | Status: SHIPPED | OUTPATIENT
Start: 2025-04-01

## 2025-04-01 NOTE — PROGRESS NOTES
SUBJECTIVE:  Montserrat Mendez is a 64 y.o. y/o female that presents with Pharyngitis, Generalized Body Aches, Fever, Vomiting, and Cough  .    HPI:      Symptoms have been present for 1 week(s).  Symptoms are worse since they initially started.    Fever? Yes  Runny nose or congestion?  Yes  Cough?  Yes  Sore throat?  Yes  Shortness of breath/Wheezing? No  Other associated symptoms?  headaches, vomiting   Drinking fluids, not eating much  Trouble swallowing    Exposed to strep  + for strep today    Diabetes  The 10-year ASCVD risk score (Nick CHAVES, et al., 2019) is: 15.7%  Statin intolerance listed on allergies  Urine microalbumin normal  But on statin Crestor and tolerating well   A1C stable 6.1% today     HTN  Amlodipine  Stable  Controlled today     Anemia-    stable  Need updated labs        Colon cancer screening -  with Dr Lamb, per patient not due till   Breast cancer screening - 2025 kristian mammogram  Dexa- 2025 normal bone density    Past Medical History:   Diagnosis Date    Brain cyst     GERD (gastroesophageal reflux disease)     Hyperlipidemia     Hypertension          Tobacco Use      Smoking status: Former        Packs/day: 0.00        Years: 1 pack/day for 10.0 years (10.0 ttl pk-yrs)        Types: Cigarettes        Start date: 1980        Quit date: 1990        Years since quittin.2        Passive exposure: Past      Smokeless tobacco: Never          OBJECTIVE:  /68   Temp 99.1 °F (37.3 °C) (Temporal)   Resp 14   Ht 1.575 m (5' 2\")   Wt 75.4 kg (166 lb 3.2 oz)   BMI 30.40 kg/m²   General appearance: alert, ill appearing, and in no distress, oriented to person, place, and time, and normal appearing weight.  ENT exam reveals - pharynx erythematous without exudate.   CVS exam: normal rate, regular rhythm, normal S1, S2, no murmurs, rubs, clicks or gallops.  Chest:clear to auscultation, no wheezes, rales or rhonchi, symmetric air entry.   Abdominal exam: soft,

## 2025-04-03 ENCOUNTER — TELEPHONE (OUTPATIENT)
Dept: FAMILY MEDICINE CLINIC | Age: 65
End: 2025-04-03

## 2025-04-03 RX ORDER — BENZONATATE 200 MG/1
200 CAPSULE ORAL 3 TIMES DAILY PRN
Qty: 30 CAPSULE | Refills: 0 | Status: SHIPPED | OUTPATIENT
Start: 2025-04-03 | End: 2025-04-13

## 2025-04-03 NOTE — TELEPHONE ENCOUNTER
Pt called in and stated that she has had a cough since Monday. She is having a hard time sleeping due to coughing a lot night.   Sinus drainage as well   Denies any other symptoms    Pharmacy: [facility] on [address]

## 2025-04-03 NOTE — TELEPHONE ENCOUNTER
Pt informed of medication being sent . Pt voiced understanding with no further questions at this time.

## 2025-04-10 ENCOUNTER — TELEPHONE (OUTPATIENT)
Dept: FAMILY MEDICINE CLINIC | Age: 65
End: 2025-04-10

## 2025-04-10 RX ORDER — DEXTROMETHORPHAN HYDROBROMIDE AND PROMETHAZINE HYDROCHLORIDE 15; 6.25 MG/5ML; MG/5ML
5 SYRUP ORAL 4 TIMES DAILY PRN
Qty: 100 ML | Refills: 0 | Status: SHIPPED | OUTPATIENT
Start: 2025-04-10 | End: 2025-04-15

## 2025-04-10 NOTE — TELEPHONE ENCOUNTER
Pt c/o cough non productive  She states the tessalon  is not helping    She is requesting a cough syrup  [facility]

## 2025-05-29 ENCOUNTER — TELEPHONE (OUTPATIENT)
Dept: FAMILY MEDICINE CLINIC | Age: 65
End: 2025-05-29

## 2025-05-29 ENCOUNTER — LAB (OUTPATIENT)
Dept: LAB | Age: 65
End: 2025-05-29

## 2025-05-29 ENCOUNTER — OFFICE VISIT (OUTPATIENT)
Dept: FAMILY MEDICINE CLINIC | Age: 65
End: 2025-05-29
Payer: COMMERCIAL

## 2025-05-29 VITALS
RESPIRATION RATE: 14 BRPM | WEIGHT: 173 LBS | DIASTOLIC BLOOD PRESSURE: 62 MMHG | SYSTOLIC BLOOD PRESSURE: 118 MMHG | HEIGHT: 62 IN | BODY MASS INDEX: 31.83 KG/M2 | TEMPERATURE: 98.2 F

## 2025-05-29 DIAGNOSIS — D64.9 ANEMIA, UNSPECIFIED TYPE: ICD-10-CM

## 2025-05-29 DIAGNOSIS — E53.8 FOLATE DEFICIENCY: ICD-10-CM

## 2025-05-29 DIAGNOSIS — I10 ESSENTIAL HYPERTENSION: ICD-10-CM

## 2025-05-29 DIAGNOSIS — E78.5 DYSLIPIDEMIA: ICD-10-CM

## 2025-05-29 DIAGNOSIS — E11.9 TYPE 2 DIABETES MELLITUS WITHOUT COMPLICATION, WITHOUT LONG-TERM CURRENT USE OF INSULIN (HCC): ICD-10-CM

## 2025-05-29 DIAGNOSIS — Z00.00 MEDICARE ANNUAL WELLNESS VISIT, SUBSEQUENT: Primary | ICD-10-CM

## 2025-05-29 PROBLEM — I47.10 SVT (SUPRAVENTRICULAR TACHYCARDIA): Status: RESOLVED | Noted: 2020-10-10 | Resolved: 2025-05-29

## 2025-05-29 PROBLEM — T78.3XXA ALLERGIC ANGIOEDEMA: Status: RESOLVED | Noted: 2023-05-16 | Resolved: 2025-05-29

## 2025-05-29 PROBLEM — R07.9 CHEST PAIN: Status: RESOLVED | Noted: 2017-05-06 | Resolved: 2025-05-29

## 2025-05-29 PROBLEM — R10.11 POSTPRANDIAL RUQ PAIN: Status: RESOLVED | Noted: 2017-05-06 | Resolved: 2025-05-29

## 2025-05-29 PROBLEM — M75.42 IMPINGEMENT SYNDROME OF LEFT SHOULDER REGION: Status: RESOLVED | Noted: 2024-10-22 | Resolved: 2025-05-29

## 2025-05-29 PROBLEM — R12 HEARTBURN: Status: RESOLVED | Noted: 2017-05-06 | Resolved: 2025-05-29

## 2025-05-29 PROBLEM — R07.89 CHEST PAIN, ATYPICAL: Status: RESOLVED | Noted: 2021-04-22 | Resolved: 2025-05-29

## 2025-05-29 LAB
ALBUMIN SERPL BCG-MCNC: 4.3 G/DL (ref 3.4–4.9)
ALP SERPL-CCNC: 48 U/L (ref 38–126)
ALT SERPL W/O P-5'-P-CCNC: 17 U/L (ref 10–35)
ANION GAP SERPL CALC-SCNC: 11 MEQ/L (ref 8–16)
ANISOCYTOSIS BLD QL SMEAR: PRESENT
AST SERPL-CCNC: 32 U/L (ref 10–35)
BASOPHILS ABSOLUTE: 0 THOU/MM3 (ref 0–0.1)
BASOPHILS NFR BLD AUTO: 0.9 %
BILIRUB SERPL-MCNC: 0.4 MG/DL (ref 0.3–1.2)
BUN SERPL-MCNC: 5 MG/DL (ref 8–23)
CALCIUM SERPL-MCNC: 9.3 MG/DL (ref 8.8–10.2)
CHLORIDE SERPL-SCNC: 104 MEQ/L (ref 98–111)
CHOLEST SERPL-MCNC: 187 MG/DL (ref 100–199)
CO2 SERPL-SCNC: 24 MEQ/L (ref 22–29)
CREAT SERPL-MCNC: 0.8 MG/DL (ref 0.5–0.9)
CREAT UR-MCNC: 67.3 MG/DL
DEPRECATED RDW RBC AUTO: 43.8 FL (ref 35–45)
EOSINOPHIL NFR BLD AUTO: 2.6 %
EOSINOPHILS ABSOLUTE: 0.1 THOU/MM3 (ref 0–0.4)
ERYTHROCYTE [DISTWIDTH] IN BLOOD BY AUTOMATED COUNT: 13.8 % (ref 11.5–14.5)
FERRITIN SERPL IA-MCNC: 458 NG/ML (ref 13–150)
FOLATE SERPL-MCNC: 4.7 NG/ML (ref 4.6–34.8)
GFR SERPL CREATININE-BSD FRML MDRD: 81 ML/MIN/1.73M2
GLUCOSE SERPL-MCNC: 102 MG/DL (ref 74–109)
HCT VFR BLD AUTO: 35.8 % (ref 37–47)
HDLC SERPL-MCNC: 53 MG/DL
HGB BLD-MCNC: 11.3 GM/DL (ref 12–16)
IMM GRANULOCYTES # BLD AUTO: 0.01 THOU/MM3 (ref 0–0.07)
IMM GRANULOCYTES NFR BLD AUTO: 0.2 %
IRON SATN MFR SERPL: 25 % (ref 20–50)
IRON SERPL-MCNC: 61 UG/DL (ref 37–145)
LDLC SERPL CALC-MCNC: 108 MG/DL
LYMPHOCYTES ABSOLUTE: 2.9 THOU/MM3 (ref 1–4.8)
LYMPHOCYTES NFR BLD AUTO: 68.5 %
MCH RBC QN AUTO: 27.7 PG (ref 26–33)
MCHC RBC AUTO-ENTMCNC: 31.6 GM/DL (ref 32.2–35.5)
MCV RBC AUTO: 87.7 FL (ref 81–99)
MICROALBUMIN UR-MCNC: < 2 MG/DL
MICROALBUMIN/CREAT RATIO PNL UR: 30 MG/G (ref 0–30)
MONOCYTES ABSOLUTE: 0.3 THOU/MM3 (ref 0.4–1.3)
MONOCYTES NFR BLD AUTO: 7.3 %
NEUTROPHILS ABSOLUTE: 0.9 THOU/MM3 (ref 1.8–7.7)
NEUTROPHILS NFR BLD AUTO: 20.5 %
NRBC BLD AUTO-RTO: 0 /100 WBC
PLATELET # BLD AUTO: 289 THOU/MM3 (ref 130–400)
PLATELET BLD QL SMEAR: ADEQUATE
PMV BLD AUTO: 10.4 FL (ref 9.4–12.4)
POTASSIUM SERPL-SCNC: 4 MEQ/L (ref 3.5–5.2)
PROT SERPL-MCNC: 7.7 G/DL (ref 6.4–8.3)
RBC # BLD AUTO: 4.08 MILL/MM3 (ref 4.2–5.4)
SCAN OF BLOOD SMEAR: NORMAL
SODIUM SERPL-SCNC: 139 MEQ/L (ref 135–145)
TIBC SERPL-MCNC: 245 UG/DL (ref 171–450)
TRIGL SERPL-MCNC: 130 MG/DL (ref 0–199)
VARIANT LYMPHS BLD QL SMEAR: ABNORMAL %
VIT B12 SERPL-MCNC: 298 PG/ML (ref 232–1245)
WBC # BLD AUTO: 4.3 THOU/MM3 (ref 4.8–10.8)

## 2025-05-29 PROCEDURE — G0439 PPPS, SUBSEQ VISIT: HCPCS | Performed by: NURSE PRACTITIONER

## 2025-05-29 PROCEDURE — 3044F HG A1C LEVEL LT 7.0%: CPT | Performed by: NURSE PRACTITIONER

## 2025-05-29 PROCEDURE — 3078F DIAST BP <80 MM HG: CPT | Performed by: NURSE PRACTITIONER

## 2025-05-29 PROCEDURE — 1123F ACP DISCUSS/DSCN MKR DOCD: CPT | Performed by: NURSE PRACTITIONER

## 2025-05-29 PROCEDURE — 3074F SYST BP LT 130 MM HG: CPT | Performed by: NURSE PRACTITIONER

## 2025-05-29 ASSESSMENT — PATIENT HEALTH QUESTIONNAIRE - PHQ9
SUM OF ALL RESPONSES TO PHQ QUESTIONS 1-9: 0
2. FEELING DOWN, DEPRESSED OR HOPELESS: NOT AT ALL
SUM OF ALL RESPONSES TO PHQ QUESTIONS 1-9: 0
1. LITTLE INTEREST OR PLEASURE IN DOING THINGS: NOT AT ALL

## 2025-05-29 NOTE — TELEPHONE ENCOUNTER
----- Message from ASHLEY Lassiter CNP sent at 5/29/2025 11:29 AM EDT -----  Can we get  records from [facility]? About left wrist

## 2025-05-29 NOTE — TELEPHONE ENCOUNTER
----- Message from ASHLEY Lassiter CNP sent at 5/29/2025 11:34 AM EDT -----  Can we get records from Dr Lamb office? Thank you

## 2025-05-29 NOTE — PROGRESS NOTES
Medicare Annual Wellness Visit    Montserrat Mendez is here for Medicare AWV    Assessment & Plan   Medicare annual wellness visit, subsequent  Type 2 diabetes mellitus without complication, without long-term current use of insulin (HCC)  -     Albumin/Creatinine Ratio, Urine (uACR); Future  -     Lipid Panel; Future  -     CBC with Auto Differential; Future  -     Comprehensive Metabolic Panel; Future   A1C 6.1% in April. Controlled wo medications  Dyslipidemia   Continue Crestor   Due for labs  Essential hypertension   Continue amlodipine   Due for labs   Controlled today  Folate deficiency  -     Vitamin B12 & Folate; Future  -     Iron; Future  -     Iron Saturation; Future  -     Ferritin; Future  Anemia, unspecified type  -     Vitamin B12 & Folate; Future  -     Iron; Future  -     Iron Saturation; Future  -     Ferritin; Future   Due for labs   No dark tarry stools, no GERD symptoms  Never saw GI last year        No follow-ups on file.     Subjective          HPI    Left wrist pain     Last few months  Saw [facility]  Had xray and MRI  Offered steroid injection but pt declined        HLD    On crestor      Diabetes  Diet controlled  The 10-year ASCVD risk score (Nick CHAVES, et al., 2019) is: 17.5%  Statin intolerance listed on allergies  Urine microalbumin normal  But on statin Crestor and tolerating well      HTN  Amlodipine  Stable  Controlled today     Anemia-    stable  Iron labs ok  Folate low, started on supplementation but had side effects   hasn't had labs done in last year  +fatigue         Colon cancer screening - 2019 with Dr Lamb, per patient not due till 2029 will get records  Breast Cancer screening - mammogram 1/2025 benign  Dexa- normal bone density 1/2025    Patient's complete Health Risk Assessment and screening values have been reviewed and are found in Flowsheets. The following problems were reviewed today and where indicated follow up appointments were made and/or referrals

## 2025-05-29 NOTE — TELEPHONE ENCOUNTER
I called and scheduled Montserrat for a mammogram on Wed, Oct 23, 24 at 1130am. Pt is aware and states that she will be there.     [facility] DIGITAL SCREEN W OR WO CAD BILATERAL  * Arrive 15 minutes prior  * No powder, lotion, or deodorant under the arms or around the breast area  * Breastfeeding and Lactating Mothers should plan to pump/feed to empty breasts immediately prior to their breast imaging exam    Dept directions for [facility]:  [facility] [place]  [address]  [place] [address]    Enter the main entrance of this medical building.  Take the elevators to the 2nd floor, exit to the left and turn left down hallway.  [facility], [address] is the first door on the right.      Patient escorted to 5 room in stable condition. Name, birthdate and allergies verified with patient.

## 2025-05-29 NOTE — TELEPHONE ENCOUNTER
----- Message from ASHLEY Lassiter CNP sent at 5/29/2025 11:33 AM EDT -----  Can we get records from [facility]? Thank you

## 2025-05-30 ENCOUNTER — RESULTS FOLLOW-UP (OUTPATIENT)
Dept: FAMILY MEDICINE CLINIC | Age: 65
End: 2025-05-30

## 2025-06-02 ENCOUNTER — TELEPHONE (OUTPATIENT)
Dept: FAMILY MEDICINE CLINIC | Age: 65
End: 2025-06-02

## 2025-06-02 DIAGNOSIS — D64.9 NORMOCYTIC ANEMIA: Primary | ICD-10-CM

## 2025-06-02 NOTE — TELEPHONE ENCOUNTER
I called and spoke to Montserrat and she verbalized understanding and had no questions at this time.    Pt would like referral sent to Hematology.

## 2025-06-02 NOTE — TELEPHONE ENCOUNTER
My Note Signed 10:08 AM        ----- Message from ASHLEY Lassiter CNP sent at 6/1/2025  8:17 PM EDT -----  Blood counts, (hemoglobin) is still low, despite iron studies being normal.  Id recommend seeing hematology for further evaluation. If she is agreeable I will place orders for her.  Kidney function stable  Cholesterol looks good

## 2025-06-09 ENCOUNTER — TELEPHONE (OUTPATIENT)
Dept: FAMILY MEDICINE CLINIC | Age: 65
End: 2025-06-09

## 2025-06-09 NOTE — TELEPHONE ENCOUNTER
Pt informed of calling [facility] to follow up MRI results. Pt voiced understanding with no further questions at this time.

## 2025-06-09 NOTE — TELEPHONE ENCOUNTER
Left message on answering machine. Requested pt to call back at [phone], at their earliest convenience.

## 2025-06-09 NOTE — TELEPHONE ENCOUNTER
Patient called in asking if we got the results from [facility] about her left wrist. I see that we did get records from them about that. She said no one ever from [facility] told her what is wrong with her wrist. She wants to know if you know?     Please advise

## 2025-06-09 NOTE — TELEPHONE ENCOUNTER
I would definitely follow up with [facility] to discuss options, because they listed options of injections for treatment and diagnostic purposes vs possible surgery. Her MRI showed a lot of inflammation of the tendons and ligament sprain and possible tear of cartilage. They did recommend  immobilization with a velcro wrist splint and course of anti inflammatories since she declined injection

## 2025-06-24 ENCOUNTER — HOSPITAL ENCOUNTER (EMERGENCY)
Age: 65
Discharge: HOME | End: 2025-06-24
Payer: COMMERCIAL

## 2025-06-24 VITALS
SYSTOLIC BLOOD PRESSURE: 105 MMHG | DIASTOLIC BLOOD PRESSURE: 68 MMHG | TEMPERATURE: 98.3 F | OXYGEN SATURATION: 99 % | RESPIRATION RATE: 16 BRPM | HEART RATE: 59 BPM

## 2025-06-24 DIAGNOSIS — G89.29 CHRONIC PAIN OF LEFT WRIST: ICD-10-CM

## 2025-06-24 DIAGNOSIS — M65.311 TRIGGER FINGER OF RIGHT THUMB: Primary | ICD-10-CM

## 2025-06-24 DIAGNOSIS — M65.4 DE QUERVAIN'S TENOSYNOVITIS, RIGHT: ICD-10-CM

## 2025-06-24 DIAGNOSIS — M25.532 CHRONIC PAIN OF LEFT WRIST: ICD-10-CM

## 2025-06-24 PROCEDURE — 99213 OFFICE O/P EST LOW 20 MIN: CPT

## 2025-06-24 RX ORDER — IBUPROFEN 600 MG/1
600 TABLET, FILM COATED ORAL 3 TIMES DAILY PRN
Qty: 30 TABLET | Refills: 0 | Status: SHIPPED | OUTPATIENT
Start: 2025-06-24

## 2025-06-24 ASSESSMENT — PAIN DESCRIPTION - FREQUENCY: FREQUENCY: CONTINUOUS

## 2025-06-24 ASSESSMENT — PAIN - FUNCTIONAL ASSESSMENT: PAIN_FUNCTIONAL_ASSESSMENT: 0-10

## 2025-06-24 ASSESSMENT — PAIN DESCRIPTION - DESCRIPTORS: DESCRIPTORS: ACHING

## 2025-06-24 ASSESSMENT — PAIN DESCRIPTION - ORIENTATION: ORIENTATION: RIGHT;LEFT

## 2025-06-24 ASSESSMENT — PAIN SCALES - GENERAL: PAINLEVEL_OUTOF10: 9

## 2025-06-24 ASSESSMENT — PAIN DESCRIPTION - PAIN TYPE: TYPE: ACUTE PAIN

## 2025-06-24 ASSESSMENT — PAIN DESCRIPTION - LOCATION: LOCATION: WRIST

## 2025-06-24 NOTE — ED PROVIDER NOTES
[facility] URGENT CARE      URGENT CARE     Pt Name: Montserrat Mendez  MRN: [id number]  Birthdate 1960  Date of evaluation: 6/24/2025  Provider: ASHLEY Lofton CNP    Urgent Care Encounter     CHIEF COMPLAINT       Chief Complaint   Patient presents with   • Wrist Pain      Bilat   since late December.      HISTORY OF PRESENT ILLNESS   Montserrat Mendez is a 65 y.o. female who presents to urgent care chief complaint of bilateral wrist pain.  Worse on right.  Started over a year ago, overall worsening the last couple weeks.  Denies trauma or injury.  Denies numbness or tingling.  Denies historical surgery.  Evaluated by [facility] quite sometime ago and was told \"this is just normal wear and tear.\"  Pain elicited via movement, describes her fingers as getting stuck on several times and difficulty opening and closing her hand/making a fist.  She has been attempting to treat with elastic wrist wraps she was [place] to orthopedics.    History obtained from patient    PAST MEDICAL HISTORY         Diagnosis Date   • Allergic angioedema 05/16/2023   • Brain cyst    • GERD (gastroesophageal reflux disease)    • Hyperlipidemia    • Hypertension    • Impingement syndrome of left shoulder region 10/22/2024     SURGICALHISTORY     Patient  has a past surgical history that includes Hysterectomy; eye surgery (Right, 2017); Eye surgery; Colonoscopy (2009); Upper gastrointestinal endoscopy (2016); and Colonoscopy (Left, 10/22/2019).  CURRENT MEDICATIONS       Previous Medications    AMLODIPINE (NORVASC) 5 MG TABLET    TAKE 1 TABLET BY MOUTH DAILY    DICLOFENAC SODIUM (VOLTAREN) 1 % GEL    Apply 4 g topically 4 times daily    ROSUVASTATIN (CRESTOR) 40 MG TABLET    TAKE 1 TABLET BY MOUTH EVERY EVENING     ALLERGIES     Patient is is allergic to cephalexin, estrogens, prednisone, statins, sulfa antibiotics, and amoxicillin.  Patients   Immunization History   Administered Date(s) Administered   • COVID-19, MODERNA BLUE

## 2025-06-24 NOTE — DISCHARGE INSTRUCTIONS
There are multiple different things going on with both of your wrist.  Typically pain is managed with anti-inflammatories and either heat/ice depending on what works better for you.  You should wear the wrist splints at night for issue here at night when you are sleeping, this will help keep joints in neutral position.    Use anti-inflammatories such as prescribed Motrin.  You can take this morning/evening for a few days to help decrease inflammation in your wrists.  Please take it on a as needed basis after that.  Hydrate well keeping urine clear/pale yellow as this is excreted by your kidneys.    Please follow-up with orthopedics.  As we discussed you would like an alternative to [place] of orthopedics.  I have provided contact information for  orthopedics.  Please call them to schedule an appointment and see if there is anything that they can do to help you with your chronic wrist pain.    If you have acute injury to area or experience symptoms including not limited to uncontrolled pain, fever, chills or any other emergent medical concerns please return to urgent care or if things are out-of-control please go to ER.

## 2025-06-26 ENCOUNTER — HOSPITAL ENCOUNTER (OUTPATIENT)
Dept: INFUSION THERAPY | Age: 65
Discharge: HOME | End: 2025-06-26
Payer: COMMERCIAL

## 2025-06-26 ENCOUNTER — OFFICE VISIT (OUTPATIENT)
Dept: ONCOLOGY | Age: 65
End: 2025-06-26
Payer: COMMERCIAL

## 2025-06-26 VITALS
SYSTOLIC BLOOD PRESSURE: 148 MMHG | TEMPERATURE: 98.6 F | OXYGEN SATURATION: 96 % | HEART RATE: 47 BPM | DIASTOLIC BLOOD PRESSURE: 61 MMHG

## 2025-06-26 VITALS
TEMPERATURE: 98.6 F | SYSTOLIC BLOOD PRESSURE: 148 MMHG | OXYGEN SATURATION: 96 % | DIASTOLIC BLOOD PRESSURE: 61 MMHG | RESPIRATION RATE: 16 BRPM | HEART RATE: 47 BPM

## 2025-06-26 DIAGNOSIS — D64.9 ANEMIA, UNSPECIFIED TYPE: Primary | ICD-10-CM

## 2025-06-26 DIAGNOSIS — D64.9 ANEMIA, UNSPECIFIED TYPE: ICD-10-CM

## 2025-06-26 LAB
ANTI-COMPLEMENT: NORMAL
B2 MICROGLOB SERPL-MCNC: 2.6 MG/L
BASOPHILS ABSOLUTE: 0 THOU/MM3 (ref 0–0.1)
BASOPHILS NFR BLD AUTO: 1.1 %
CRP SERPL-MCNC: 0.48 MG/DL (ref 0–0.5)
DAT IGG: NORMAL
DAT POLY-SP REAG RBC QL: NORMAL
DEPRECATED RDW RBC AUTO: 43.5 FL (ref 35–45)
EOSINOPHIL NFR BLD AUTO: 1.8 %
EOSINOPHILS ABSOLUTE: 0.1 THOU/MM3 (ref 0–0.4)
ERYTHROCYTE [DISTWIDTH] IN BLOOD BY AUTOMATED COUNT: 13.8 % (ref 11.5–14.5)
ERYTHROCYTE [SEDIMENTATION RATE] IN BLOOD BY WESTERGREN METHOD: 27 MM/HR (ref 0–20)
FERRITIN SERPL IA-MCNC: 397 NG/ML (ref 13–150)
HAPTOGLOB SERPL-MCNC: 59 MG/DL (ref 30–200)
HCT VFR BLD AUTO: 35.4 % (ref 37–47)
HGB BLD-MCNC: 11.4 GM/DL (ref 12–16)
HGB RETIC QN AUTO: 30.8 PG (ref 28.2–35.7)
IGA SERPL-MCNC: 297 MG/DL (ref 70–400)
IGG SERPL-MCNC: 1640 MG/DL (ref 700–1600)
IGM SERPL-MCNC: 85 MG/DL (ref 40–230)
IMM GRANULOCYTES # BLD AUTO: 0 THOU/MM3 (ref 0–0.07)
IMM GRANULOCYTES NFR BLD AUTO: 0 %
IMM RETICS NFR: 9.4 % (ref 3–15.9)
LDH SERPL L TO P-CCNC: 193 U/L (ref 135–214)
LYMPHOCYTES ABSOLUTE: 3 THOU/MM3 (ref 1–4.8)
LYMPHOCYTES NFR BLD AUTO: 67.4 %
MCH RBC QN AUTO: 28 PG (ref 26–33)
MCHC RBC AUTO-ENTMCNC: 32.2 GM/DL (ref 32.2–35.5)
MCV RBC AUTO: 87 FL (ref 81–99)
MONOCYTES ABSOLUTE: 0.3 THOU/MM3 (ref 0.4–1.3)
MONOCYTES NFR BLD AUTO: 7.4 %
NEUTROPHILS ABSOLUTE: 1 THOU/MM3 (ref 1.8–7.7)
NEUTROPHILS NFR BLD AUTO: 22.3 %
NRBC BLD AUTO-RTO: 0 /100 WBC
PATHOLOGIST REVIEW: ABNORMAL
PLATELET # BLD AUTO: 264 THOU/MM3 (ref 130–400)
PMV BLD AUTO: 11.1 FL (ref 9.4–12.4)
RBC # BLD AUTO: 4.07 MILL/MM3 (ref 4.2–5.4)
RETICS # AUTO: 46 THOU/MM3 (ref 20–115)
RETICS/RBC NFR AUTO: 1.1 % (ref 0.5–2)
REVIEWED BY: NORMAL
SMEAR REVIEW: NORMAL
WBC # BLD AUTO: 4.4 THOU/MM3 (ref 4.8–10.8)

## 2025-06-26 PROCEDURE — 86334 IMMUNOFIX E-PHORESIS SERUM: CPT

## 2025-06-26 PROCEDURE — 1123F ACP DISCUSS/DSCN MKR DOCD: CPT | Performed by: NURSE PRACTITIONER

## 2025-06-26 PROCEDURE — 83615 LACTATE (LD) (LDH) ENZYME: CPT

## 2025-06-26 PROCEDURE — 99211 OFF/OP EST MAY X REQ PHY/QHP: CPT

## 2025-06-26 PROCEDURE — 99204 OFFICE O/P NEW MOD 45 MIN: CPT | Performed by: NURSE PRACTITIONER

## 2025-06-26 PROCEDURE — 82232 ASSAY OF BETA-2 PROTEIN: CPT

## 2025-06-26 PROCEDURE — 83010 ASSAY OF HAPTOGLOBIN QUANT: CPT

## 2025-06-26 PROCEDURE — 82728 ASSAY OF FERRITIN: CPT

## 2025-06-26 PROCEDURE — 36415 COLL VENOUS BLD VENIPUNCTURE: CPT

## 2025-06-26 PROCEDURE — 86140 C-REACTIVE PROTEIN: CPT

## 2025-06-26 PROCEDURE — 86880 COOMBS TEST DIRECT: CPT

## 2025-06-26 PROCEDURE — 82525 ASSAY OF COPPER: CPT

## 2025-06-26 PROCEDURE — 82784 ASSAY IGA/IGD/IGG/IGM EACH: CPT

## 2025-06-26 PROCEDURE — 3078F DIAST BP <80 MM HG: CPT | Performed by: NURSE PRACTITIONER

## 2025-06-26 PROCEDURE — 85651 RBC SED RATE NONAUTOMATED: CPT

## 2025-06-26 PROCEDURE — 3077F SYST BP >= 140 MM HG: CPT | Performed by: NURSE PRACTITIONER

## 2025-06-26 PROCEDURE — 85025 COMPLETE CBC W/AUTO DIFF WBC: CPT

## 2025-06-26 PROCEDURE — 84155 ASSAY OF PROTEIN SERUM: CPT

## 2025-06-26 PROCEDURE — 84165 PROTEIN E-PHORESIS SERUM: CPT

## 2025-06-26 PROCEDURE — 85046 RETICYTE/HGB CONCENTRATE: CPT

## 2025-06-26 PROCEDURE — 1159F MED LIST DOCD IN RCRD: CPT | Performed by: NURSE PRACTITIONER

## 2025-06-26 NOTE — PROGRESS NOTES
[facility] [address], [address]  [facility] [address]  Dept: [phone]  Dept Fax: [phone] Loc: [phone]      Visit Date:6/26/2025     Montserrat Mendez is a 65 y.o. female who presents today for:   Chief Complaint   Patient presents with    New Patient     Anemia        HPI:   Montserrat Mendez is a 65 y.o. female with PMH CAD, HLD, carpal tunnel who was referred for anemia by her PCP. She is here with her  and granddaughter.   Labwork back to 2017 shows intermittent mild anemia from 11.3-11/8.  She has had leukopenia over the last year with WBC 4.3-4.6.  She states she has chronic sinusitis and current inflammation in her left wrist.  She had an infection in April but has been healthy since.  Iron studies and b12/folate were normal but does have elevated ferritin in the 400s.      She denies family history of anemia or blood disorders.  She eats a normal diet. Endorses fatigue, but denies dizziness, SOB, CP, abdominal pain, food cravings, blood loss, recent trauma or surgery, current infection.      We discussed causes of anemia including hemolytic anemia, bleeding, liver dysfunction, infection/inflammation, bone marrow dysfunction, anemia of chronic disease, aging.    We discussed that we would draw labwork today and we would call her with results.  I answered all her and her 's questions        PMH, SH, and FH:  I reviewed the patients medication list and allergy list as noted on the electronic medical record. The PMH, SH and FH were also reviewed as noted on the EMR.      Review of Systems:   10 pt ROS   Pertinent review of systems noted in HPI, all other ROS negative.   Objective:   Physical Exam   BP (!) 148/61   Pulse (!) 47   Temp 98.6 °F (37 °C) (Oral)   SpO2 96%    General appearance: No apparent distress, alert and cooperative.  HEENT: Pupils equal, round, and reactive to light. Conjunctivae/corneas clear. Oral mucosa moist  Neck: Supple, with full

## 2025-06-28 LAB — COPPER SERPL-MCNC: 90 UG/DL (ref 80–155)

## 2025-06-30 LAB — IMMUNOFIXATION WITH QUANT: NORMAL

## 2025-07-19 ENCOUNTER — HOSPITAL ENCOUNTER (EMERGENCY)
Age: 65
Discharge: HOME | End: 2025-07-19
Payer: COMMERCIAL

## 2025-07-19 VITALS
SYSTOLIC BLOOD PRESSURE: 127 MMHG | TEMPERATURE: 98.6 F | HEART RATE: 56 BPM | WEIGHT: 172 LBS | DIASTOLIC BLOOD PRESSURE: 65 MMHG | RESPIRATION RATE: 18 BRPM | HEIGHT: 62 IN | OXYGEN SATURATION: 97 % | BODY MASS INDEX: 31.65 KG/M2

## 2025-07-19 DIAGNOSIS — H61.21 IMPACTED CERUMEN OF RIGHT EAR: Primary | ICD-10-CM

## 2025-07-19 PROCEDURE — 99213 OFFICE O/P EST LOW 20 MIN: CPT

## 2025-07-19 PROCEDURE — 69209 REMOVE IMPACTED EAR WAX UNI: CPT

## 2025-07-19 NOTE — ED TRIAGE NOTES
Patient ambulatory to room 3 for complaint of feeling like there is something in her right ear.  Patient reports this sensation x 1 week.  Patient states that it affects her hearing at times.

## 2025-07-19 NOTE — ED PROVIDER NOTES
[facility] URGENT CARE  Urgent Care Encounter      CHIEF COMPLAINT       Chief Complaint   Patient presents with    Ear Fullness     Right       Nurses Notes reviewed and I agree except as noted in the HPI.  HISTORY OF PRESENT ILLNESS   Montserrat Mendez is a 65 y.o. female who presents to urgent care with complaints of feeling like she has something in her right ear.  Patient reports she has had this sensation for approximately 1 week.  Reports she feels like she is able to hear less out of her right ear.  Denies dizziness, headache, fevers.  Denies recent illness.    REVIEW OF SYSTEMS     Review of Systems   Constitutional:  Negative for fever.   HENT:  Positive for ear pain (Ear Fullness).    Neurological:  Negative for dizziness, light-headedness and headaches.       PAST MEDICAL HISTORY         Diagnosis Date    Allergic angioedema 05/16/2023    Brain cyst     GERD (gastroesophageal reflux disease)     Hyperlipidemia     Hypertension     Impingement syndrome of left shoulder region 10/22/2024       SURGICAL HISTORY     Patient  has a past surgical history that includes Hysterectomy; eye surgery (Right, 2017); Eye surgery; Colonoscopy (2009); Upper gastrointestinal endoscopy (2016); and Colonoscopy (Left, 10/22/2019).    CURRENT MEDICATIONS       Previous Medications    AMLODIPINE (NORVASC) 5 MG TABLET    TAKE 1 TABLET BY MOUTH DAILY    DICLOFENAC SODIUM (VOLTAREN) 1 % GEL    Apply 4 g topically 4 times daily    IBUPROFEN (ADVIL;MOTRIN) 600 MG TABLET    Take 1 tablet by mouth 3 times daily as needed for Pain    ROSUVASTATIN (CRESTOR) 40 MG TABLET    TAKE 1 TABLET BY MOUTH EVERY EVENING       ALLERGIES     Patient is is allergic to cephalexin, estrogens, prednisone, statins, sulfa antibiotics, and amoxicillin.    FAMILY HISTORY     Patient'sfamily history includes Breast Cancer in her sister; Diabetes in her mother; Heart Disease in her mother; High Blood Pressure in her father and mother.    SOCIAL HISTORY    Debrox drops to assist with symptoms and softening cerumen.  Discussed with patient if symptoms do not improve over the next week to follow-up with her primary care provider.  Patient in agreement with this plan.    PATIENT REFERRED TO:  Nia Mckeon APRN - CNP  [address]  [place] [id number] [phone]    Schedule an appointment as soon as possible for a visit   As needed    [facility] Emergency Department  [address] [place] [address]  [phone]  Go to   Go directly to [facility] ER, If symptoms worsen    DISCHARGE MEDICATIONS:  New Prescriptions    CARBAMIDE PEROXIDE (DEBROX) 6.5 % OTIC SOLUTION    Place 5 drops into the right ear 2 times daily     Current Discharge Medication List          ASHLEY Zimmerman CNP, Alecksa N, APRN - CNP  07/19/25 1936

## 2025-07-19 NOTE — ED NOTES
Bilateral ears irrigated with moderate amount of earwax removed from left side, very small amount from right.  Provider updated.      Loly Garcia RN  07/19/25 1928

## 2025-07-24 ENCOUNTER — OFFICE VISIT (OUTPATIENT)
Dept: FAMILY MEDICINE CLINIC | Age: 65
End: 2025-07-24

## 2025-07-24 VITALS
DIASTOLIC BLOOD PRESSURE: 68 MMHG | TEMPERATURE: 97.2 F | HEIGHT: 62 IN | RESPIRATION RATE: 16 BRPM | WEIGHT: 171.8 LBS | OXYGEN SATURATION: 99 % | HEART RATE: 51 BPM | SYSTOLIC BLOOD PRESSURE: 122 MMHG | BODY MASS INDEX: 31.62 KG/M2

## 2025-07-24 DIAGNOSIS — H61.21 IMPACTED CERUMEN OF RIGHT EAR: Primary | ICD-10-CM

## 2025-07-24 DIAGNOSIS — H60.501 ACUTE OTITIS EXTERNA OF RIGHT EAR, UNSPECIFIED TYPE: ICD-10-CM

## 2025-07-24 RX ORDER — OFLOXACIN 3 MG/ML
5 SOLUTION AURICULAR (OTIC) 2 TIMES DAILY
Qty: 5 ML | Refills: 0 | Status: SHIPPED | OUTPATIENT
Start: 2025-07-24 | End: 2025-08-03

## 2025-07-24 NOTE — ADDENDUM NOTE
Addended by: KOSTAS WELLER on: 7/24/2025 01:43 PM     Modules accepted: Orders, Level of Service

## 2025-07-24 NOTE — PROGRESS NOTES
Montserrat Mendez is a 65 y.o. female thatpresents for Ear Fullness      History obtained today from Patient.    History of Present Illness  The patient presents for evaluation of right ear cerumen impaction.    She sought care at an urgent care facility 5 days ago due to a sensation of fullness in her right ear, which she reports has since worsened. The left ear was successfully cleaned and unplugged during her visit to the urgent care, and she no longer experiences any discomfort in that ear. This is her first experience with wax buildup in her ears. Despite being prescribed Debrox, she has not experienced any relief. Her hearing in the affected ear is muffled.          I have reviewed the patient's past medical history, past surgical history, allergies,medications, social and family history and I have made updates where appropriate.    Past Medical History:   Diagnosis Date    Allergic angioedema 2023    Brain cyst     GERD (gastroesophageal reflux disease)     Hyperlipidemia     Hypertension     Impingement syndrome of left shoulder region 10/22/2024       Social History     Tobacco Use    Smoking status: Former     Current packs/day: 0.00     Average packs/day: 1 pack/day for 10.0 years (10.0 ttl pk-yrs)     Types: Cigarettes     Start date: 1980     Quit date: 1990     Years since quittin.5     Passive exposure: Past    Smokeless tobacco: Never   Vaping Use    Vaping status: Never Used   Substance Use Topics    Alcohol use: No    Drug use: No       Family History   Problem Relation Age of Onset    Heart Disease Mother     High Blood Pressure Mother     Diabetes Mother     High Blood Pressure Father     Breast Cancer Sister     Colon Cancer Neg Hx     Cancer Neg Hx          Review of Systems        PHYSICAL EXAM:  /68   Pulse 51   Temp 97.2 °F (36.2 °C)   Resp 16   Ht 1.575 m (5' 2\")   Wt 77.9 kg (171 lb 12.8 oz)   SpO2 99%   BMI 31.42 kg/m²     Physical Exam  Constitutional:

## (undated) DEVICE — LINER SUCT CANSTR 1500CC SEMI RIG W/ POR HYDROPHOBIC SHUT

## (undated) DEVICE — CO2 CANNULA,SUPERSOFT, ADLT,7'O2,7'CO2: Brand: MEDLINE

## (undated) DEVICE — CATHETER ETER IV 22GA L1IN POLYUR STR RADPQ INTROCAN SFTY

## (undated) DEVICE — TUBING IV STOPCOCK 48 CM 3 W

## (undated) DEVICE — SOLUTION IV 1000ML 0.45% SOD CHL PH 5 INJ USP VIAFLX PLAS

## (undated) DEVICE — SOLUTION IV IRRIG WATER 500ML POUR BRL ST 2F7113

## (undated) DEVICE — IV START KIT: Brand: MEDLINE INDUSTRIES, INC.

## (undated) DEVICE — SET LNR RED GRN W/ BASE CLEANASCOPE

## (undated) DEVICE — KIT INF CTRL 2OZ LUB TBNG L12FT DBL END BRSH SYR OP4

## (undated) DEVICE — SET ADMIN 25ML L117IN PMP MOD CK VLV RLER CLMP 2 SMRTSITE